# Patient Record
Sex: MALE | Race: WHITE | NOT HISPANIC OR LATINO | Employment: UNEMPLOYED | URBAN - NONMETROPOLITAN AREA
[De-identification: names, ages, dates, MRNs, and addresses within clinical notes are randomized per-mention and may not be internally consistent; named-entity substitution may affect disease eponyms.]

---

## 2020-10-22 ENCOUNTER — TRANSCRIBE ORDERS (OUTPATIENT)
Dept: ADMINISTRATIVE | Facility: HOSPITAL | Age: 40
End: 2020-10-22

## 2020-10-22 DIAGNOSIS — N50.819 PAIN IN TESTICLE, UNSPECIFIED LATERALITY: Primary | ICD-10-CM

## 2020-10-22 DIAGNOSIS — M54.50 LOW BACK PAIN, UNSPECIFIED BACK PAIN LATERALITY, UNSPECIFIED CHRONICITY, UNSPECIFIED WHETHER SCIATICA PRESENT: ICD-10-CM

## 2020-10-29 ENCOUNTER — APPOINTMENT (OUTPATIENT)
Dept: ULTRASOUND IMAGING | Facility: HOSPITAL | Age: 40
End: 2020-10-29

## 2022-03-14 ENCOUNTER — APPOINTMENT (OUTPATIENT)
Dept: OTHER | Facility: HOSPITAL | Age: 42
End: 2022-03-14

## 2022-03-14 ENCOUNTER — HOSPITAL ENCOUNTER (INPATIENT)
Facility: HOSPITAL | Age: 42
LOS: 2 days | Discharge: HOME | End: 2022-03-16
Attending: INTERNAL MEDICINE | Admitting: INTERNAL MEDICINE

## 2022-03-14 DIAGNOSIS — I63.9 CEREBROVASCULAR ACCIDENT (CVA), UNSPECIFIED MECHANISM: Primary | ICD-10-CM

## 2022-03-14 PROBLEM — E66.9 OBESITY: Status: ACTIVE | Noted: 2022-03-14

## 2022-03-14 PROBLEM — I10 HYPERTENSION: Chronic | Status: ACTIVE | Noted: 2022-03-14

## 2022-03-14 PROBLEM — E78.5 DYSLIPIDEMIA: Chronic | Status: ACTIVE | Noted: 2022-03-14

## 2022-03-14 PROBLEM — E66.9 OBESITY: Chronic | Status: ACTIVE | Noted: 2022-03-14

## 2022-03-14 PROBLEM — I10 HYPERTENSION: Status: ACTIVE | Noted: 2022-03-14

## 2022-03-14 PROBLEM — E78.5 DYSLIPIDEMIA: Status: ACTIVE | Noted: 2022-03-14

## 2022-03-14 PROBLEM — Z86.16 PERSONAL HISTORY OF COVID-19: Status: ACTIVE | Noted: 2022-03-14

## 2022-03-14 LAB — GLUCOSE BLDC GLUCOMTR-MCNC: 119 MG/DL (ref 70–130)

## 2022-03-14 PROCEDURE — 99223 1ST HOSP IP/OBS HIGH 75: CPT | Performed by: NURSE PRACTITIONER

## 2022-03-14 PROCEDURE — 99222 1ST HOSP IP/OBS MODERATE 55: CPT | Performed by: INTERNAL MEDICINE

## 2022-03-14 PROCEDURE — 82962 GLUCOSE BLOOD TEST: CPT

## 2022-03-14 RX ORDER — SODIUM CHLORIDE 0.9 % (FLUSH) 0.9 %
10 SYRINGE (ML) INJECTION EVERY 12 HOURS SCHEDULED
Status: DISCONTINUED | OUTPATIENT
Start: 2022-03-14 | End: 2022-03-16 | Stop reason: HOSPADM

## 2022-03-14 RX ORDER — ASPIRIN 325 MG
325 TABLET ORAL DAILY
Status: DISCONTINUED | OUTPATIENT
Start: 2022-03-15 | End: 2022-03-15

## 2022-03-14 RX ORDER — METOPROLOL SUCCINATE 25 MG/1
50 TABLET, EXTENDED RELEASE ORAL DAILY
COMMUNITY
End: 2022-03-16 | Stop reason: HOSPADM

## 2022-03-14 RX ORDER — PRASTERONE (DHEA) 25 MG
25 TABLET ORAL DAILY
COMMUNITY

## 2022-03-14 RX ORDER — HYDROCHLOROTHIAZIDE 50 MG/1
50 TABLET ORAL DAILY
COMMUNITY
End: 2022-03-16 | Stop reason: HOSPADM

## 2022-03-14 RX ORDER — SODIUM CHLORIDE 0.9 % (FLUSH) 0.9 %
10 SYRINGE (ML) INJECTION AS NEEDED
Status: DISCONTINUED | OUTPATIENT
Start: 2022-03-14 | End: 2022-03-16 | Stop reason: HOSPADM

## 2022-03-14 RX ORDER — ATORVASTATIN CALCIUM 20 MG/1
10 TABLET, FILM COATED ORAL NIGHTLY
COMMUNITY
End: 2022-03-16 | Stop reason: HOSPADM

## 2022-03-14 RX ORDER — ATORVASTATIN CALCIUM 40 MG/1
80 TABLET, FILM COATED ORAL NIGHTLY
Status: DISCONTINUED | OUTPATIENT
Start: 2022-03-14 | End: 2022-03-16 | Stop reason: HOSPADM

## 2022-03-14 RX ORDER — LOSARTAN POTASSIUM 25 MG/1
25 TABLET ORAL NIGHTLY
COMMUNITY

## 2022-03-14 RX ORDER — ASPIRIN 300 MG/1
300 SUPPOSITORY RECTAL DAILY
Status: DISCONTINUED | OUTPATIENT
Start: 2022-03-15 | End: 2022-03-15

## 2022-03-14 RX ADMIN — Medication 10 ML: at 23:54

## 2022-03-14 NOTE — H&P
Intensive Care Admission Note     Chief Complaint:  Acute ischemic stroke (HCC)    History of Present Illness     Liang Caputo is a 41 y.o. male who presents to [facility] from [facility] on 3/14/22 with complaints of left sided numbness and right-sided weakness concerning for stroke.     There is a PMH of HTN, dyslipidemia, prior + COVID, and obesity . He presented to the OSH ED with intermittent numbness on his occipital region with sustained sporadic numbness in his bilateral hands, feet, and left shin. Mild right-sided weakness noted on OSH clinical exam. LKW ~1500. NIH not mentioned in ACC report. His case was discussed with our stroke navigator team who recommended tPA and transfer for ongoing work-up. Initially the patient refused transfer but is now accepting tPA and corresponding transfer. He will be admitted to the ICU for higher level of care.     Problem List, Surgical History, Family, Social History, and ROS     Patient Active Problem List    Diagnosis    • *R/O AIS s/p tPA  [I63.9]    • Hypertension [I10]    • Dyslipidemia [E78.5]    • Personal history of COVID-19 [Z86.16]    • Obesity [E66.9]      No past surgical history on file.    Not on File  No current facility-administered medications on file prior to encounter.     No current outpatient medications on file prior to encounter.     MEDICATION LIST AND ALLERGIES REVIEWED.    No family history on file.     Social History     Social History Narrative   • Not on file     FAMILY AND SOCIAL HISTORY REVIEWED.      Review of systems unobtainable secondary to patient refusal to cooperate    Physical Exam and Clinical Information   Pulse 80   SpO2 97%   Patient is awake and alert, moving all extremities.  Otherwise unable to obtain any physical exam due to patient not willing to cooperate or allow hospital personnel in the room.            Invalid input(s): CHLORIDE  CrCl cannot be calculated (No successful lab value found.).          No  results found for: LACTATE       I reviewed the patient's results/ images and I agree with the reports.     Impression     R/O AIS s/p tPA     Hypertension    Dyslipidemia    Personal history of COVID-19    Obesity      Plan/Recommendations     On arrival to the ICU patient was abrasive and rude to the staff.  He refused to let the nurse into the room to do a physical exam on the patient and do the NIH stroke scale.  I came and evaluated the patient and he is well asked me to leave the room but not allow me to do any physical exam provide any other history that was taken from the chart.  We informed the patient that he had received TPA at there are certain protocols that we go through this is a stroke center we try to obtain to the high standard of care.  He informed us that at the other hospital he was logical smoke cigarettes and he is upset that he cannot do this.  In addition to that he is upset about the fact that his wife cannot visit.  The current hospital visitation policy is that visitors are allowed between 7 AM and 5 PM.  His wife would be allowed to come in the morning at 7 AM and visit.  We informed her that if he wants to refuse all care if he is more than welcome to this would be AGAINST MEDICAL ADVICE and that we would recommend he allow us to treat him appropriately with blood pressure control, and standard treatments for CVA as well as ongoing imaging per neurology.  Ultimately he has refused all levels of care and continues to want to move around the room despite us informing that if he were to have a intracranial bleed this could be devastating to him and he is also a high risk for bleeding secondary to the TPA if he were to fall have any head trauma.  Despite all these recommendations he still continues to refuse care.  He informed me that he would not follow any medical recommendations or care and to his wife is allowed to be at bedside.  It was made available to him to leave AMA at any time,  "he refused to do this unless his wife was coming to pick him up.  His wife was informed of his behavior and ultimately she said that she would not come and pick him up and that he is to stay there and she, \"is going to call her .\"  The patient though is very aware of his situation and is in no since altered, therefore he has every right to refuse medical care we cannot treat him, despite what the wife is requesting unless the patient agrees.     -We will follow stroke order set per neurology  -Recommend aspirin/statin and further imaging per neurology recommendations, if the patient were to at some point agree to treatment.    A total of 45 minutes were spent face-to-face with the patient during this encounter and over half of that time was spent on counseling and coordination of care.  I had a prolonged detailed conversation with the patient's as noted above.    PEYTON Hilario,   Pulmonary and Critical Care Medicine     CC: Sis Duval, LUIS ARMANDO  "

## 2022-03-14 NOTE — NURSING NOTE
ACC REVIEW REPORT: [facility]        PATIENT NAME: Liang Caputo    PATIENT ID: [id number]        COVID-19 ACC SCREENING       DOES THE PATIENT HAVE A FEVER GREATER THAN OR EQUAL .4: no    IS THE PATIENT EXPERIENCING SHORTNESS OF BREATH: no    DOES THE PATIENT HAVE A COUGH: no  DOES THE PATIENT HAVE ANY OF THE FOLLOWING RISK FACTORS:    EXPOSURE TO SUSPECTED OR KNOWN COVID-19: no    RECENT TRAVEL HISTORY TO ENDEMIC AREA (DOMESTIC/LOCAL): no    IS THE PATIENT A HEALTHCARE WORKER: no    HAS THE PATIENT EXPERIENCED A LOSS OF SENSE OF TASTE OR SMELL: unknown    HAS THE PATIENT BEEN TESTED FOR COVID-19: unknown    DATE TESTED: unknown    Pt had covid > one year ago; has not had covid vaccines        BED: N 231    BED TYPE: ICU    BED GIVEN TO: Fatemeh TOMAS BED GIVEN: 1700    TODAY'S DATE: 3/14/2022    TRANSFER DATE: 3/14/22    ETA: pending transport    TRANSFERRING FACILITY: [facility]    TRANSFERRING FACILITY PHONE # : [phone]    TRANSFERRING MD: Dragan    DATE/TIME REQUEST RECEIVED: 3-14-22@[address] [place] [address] RN: Afshan Patino    REPORT FROM: Fatemeh mueller 3-way conversation with Brittney & Dr Archibald  (hurried report)    TIME REPORT TAKEN: 1650    DIAGNOSIS: CVA    REASON FOR TRANSFER TO [facility]: higher level of care    TRANSPORTATION: pending    CLINICAL REASON FOR TRANSFER TO [facility]: 41 y.o. presented to the OSH at 1609; he developed numbness on the back of his head on the left side that lasted three minutes; also has rt arm & leg weakness that continues      CLINICAL INFORMATION    HEIGHT:     WEIGHT: 313#    ALLERGIES: PCN    INFECTIOUS DISEASE: unknown    ISOLATION:     VITAL SIGNS:   TIME: 1645  TEMP: 98.4  PULSE: 96  B/P: 123/68  RESP: 24        LAB INFORMATION: glucose 140    CULTURE INFORMATION:     MEDS/IV FLUIDS: IV in rt ac - TPA to be given      CARDIAC SYSTEM:    CHEST PAIN: no    RHYTHM: NSR    Is patient taking or has patient been given any drugs that could increase bleeding? TPA to be  given                      No home blood thinner taken    CARDIAC SYSTEM:  Pt has a hx of HTN, HLD  Dr Archibald had mentioned that patient's BP waselevated      RESPIRATORY SYSTEM:    OXYGEN: no    O2 SAT: 95% on RA    RESPIRATORY STATUS: no soa      CNS/MUSCULOSKELETAL    ALERT AND ORIENTED:  yes    KIMBERLEY COMA SCALE: 15    STROKE SCALE:  Unable to cite    CAT SCAN RESULTS: neg    CNS/MUSCULOSKELETAL NOTES: per RN - pt has rt arm & leg weakness that started at 1509 and numbness on the back of his head on the left side  Per Dr Archibald - pt had numbness of sp hands & feet and left shin today while watching TV that resolved; Dr Archibald said pt has weakness of rt hand and leg, strength 4/5      GI//GY    VOMITING: no    DIARRHEA: no    NAUSEA: no    PAST MEDICAL HISTORY: HTN, HLD, TIA, obesity    ADDITIONAL NOTES: CTA of H&N to be done      1733 - Samantha at at [facility] called to state pt is refusing to come here; cancel request for transfer    1759-Dr Archibald at [facility] called requesting to speak to the stroke navigator - per 3-way with Lydia, pt is now accepting TPA and they would like to transfer pt; - pt accepted    1822- nurse Fatemeh called to say patient's sx resolved prior to TPA - TPA was given at 1810; pt refusing to fly; will come by ambulance; states labs are WNL except BUN was 14; covid test to be done prior to departure         Ebony Patino RN  3/14/2022  17:00 EDT

## 2022-03-15 ENCOUNTER — APPOINTMENT (OUTPATIENT)
Dept: CARDIOLOGY | Facility: HOSPITAL | Age: 42
End: 2022-03-15

## 2022-03-15 ENCOUNTER — APPOINTMENT (OUTPATIENT)
Dept: CT IMAGING | Facility: HOSPITAL | Age: 42
End: 2022-03-15

## 2022-03-15 ENCOUNTER — APPOINTMENT (OUTPATIENT)
Dept: MRI IMAGING | Facility: HOSPITAL | Age: 42
End: 2022-03-15

## 2022-03-15 LAB
BH CV ECHO MEAS - AO MAX PG (FULL): 4.4 MMHG
BH CV ECHO MEAS - AO MAX PG: 9 MMHG
BH CV ECHO MEAS - AO MEAN PG (FULL): 2.6 MMHG
BH CV ECHO MEAS - AO MEAN PG: 5 MMHG
BH CV ECHO MEAS - AO ROOT AREA (BSA CORRECTED): 1.6
BH CV ECHO MEAS - AO ROOT AREA: 11.3 CM^2
BH CV ECHO MEAS - AO ROOT DIAM: 3.8 CM
BH CV ECHO MEAS - AO V2 MAX: 145.5 CM/SEC
BH CV ECHO MEAS - AO V2 MEAN: 106 CM/SEC
BH CV ECHO MEAS - AO V2 VTI: 29.5 CM
BH CV ECHO MEAS - ASC AORTA: 3.4 CM
BH CV ECHO MEAS - AVA(I,A): 2.9 CM^2
BH CV ECHO MEAS - AVA(I,D): 2.9 CM^2
BH CV ECHO MEAS - AVA(V,A): 3.1 CM^2
BH CV ECHO MEAS - AVA(V,D): 3.1 CM^2
BH CV ECHO MEAS - BSA(HAYCOCK): 2.7 M^2
BH CV ECHO MEAS - BSA: 2.4 M^2
BH CV ECHO MEAS - BZI_BMI: 50.8 KILOGRAMS/M^2
BH CV ECHO MEAS - BZI_METRIC_HEIGHT: 167.6 CM
BH CV ECHO MEAS - BZI_METRIC_WEIGHT: 142.9 KG
BH CV ECHO MEAS - EDV(CUBED): 120.8 ML
BH CV ECHO MEAS - EDV(MOD-SP2): 60.1 ML
BH CV ECHO MEAS - EDV(MOD-SP4): 127 ML
BH CV ECHO MEAS - EDV(TEICH): 115.2 ML
BH CV ECHO MEAS - EF(CUBED): 84.7 %
BH CV ECHO MEAS - EF(MOD-BP): 60.4 %
BH CV ECHO MEAS - EF(MOD-SP2): 63.9 %
BH CV ECHO MEAS - EF(MOD-SP4): 59 %
BH CV ECHO MEAS - EF(TEICH): 77.8 %
BH CV ECHO MEAS - ESV(CUBED): 18.4 ML
BH CV ECHO MEAS - ESV(MOD-SP2): 21.7 ML
BH CV ECHO MEAS - ESV(MOD-SP4): 52.1 ML
BH CV ECHO MEAS - ESV(TEICH): 25.6 ML
BH CV ECHO MEAS - FS: 46.6 %
BH CV ECHO MEAS - IVS/LVPW: 1.1
BH CV ECHO MEAS - IVSD: 1.3 CM
BH CV ECHO MEAS - LA DIMENSION: 2.4 CM
BH CV ECHO MEAS - LA/AO: 0.64
BH CV ECHO MEAS - LAD MAJOR: 6.8 CM
BH CV ECHO MEAS - LAT PEAK E' VEL: 8.6 CM/SEC
BH CV ECHO MEAS - LATERAL E/E' RATIO: 9.5
BH CV ECHO MEAS - LV DIASTOLIC VOL/BSA (35-75): 52.3 ML/M^2
BH CV ECHO MEAS - LV MASS(C)D: 259.5 GRAMS
BH CV ECHO MEAS - LV MASS(C)DI: 107 GRAMS/M^2
BH CV ECHO MEAS - LV MAX PG: 4.6 MMHG
BH CV ECHO MEAS - LV MEAN PG: 2.4 MMHG
BH CV ECHO MEAS - LV SYSTOLIC VOL/BSA (12-30): 21.5 ML/M^2
BH CV ECHO MEAS - LV V1 MAX: 107.6 CM/SEC
BH CV ECHO MEAS - LV V1 MEAN: 71.9 CM/SEC
BH CV ECHO MEAS - LV V1 VTI: 20.2 CM
BH CV ECHO MEAS - LVIDD: 4.9 CM
BH CV ECHO MEAS - LVIDS: 2.6 CM
BH CV ECHO MEAS - LVLD AP2: 7.9 CM
BH CV ECHO MEAS - LVLD AP4: 8.2 CM
BH CV ECHO MEAS - LVLS AP2: 6.3 CM
BH CV ECHO MEAS - LVLS AP4: 6.9 CM
BH CV ECHO MEAS - LVOT AREA (M): 4.2 CM^2
BH CV ECHO MEAS - LVOT AREA: 4.2 CM^2
BH CV ECHO MEAS - LVOT DIAM: 2.3 CM
BH CV ECHO MEAS - LVPWD: 1.3 CM
BH CV ECHO MEAS - MED PEAK E' VEL: 8.8 CM/SEC
BH CV ECHO MEAS - MEDIAL E/E' RATIO: 9.2
BH CV ECHO MEAS - MV A MAX VEL: 56.1 CM/SEC
BH CV ECHO MEAS - MV DEC SLOPE: 509.9 CM/SEC^2
BH CV ECHO MEAS - MV DEC TIME: 0.17 SEC
BH CV ECHO MEAS - MV E MAX VEL: 81.3 CM/SEC
BH CV ECHO MEAS - MV E/A: 1.4
BH CV ECHO MEAS - MV P1/2T MAX VEL: 105.9 CM/SEC
BH CV ECHO MEAS - MV P1/2T: 60.8 MSEC
BH CV ECHO MEAS - MVA P1/2T LCG: 2.1 CM^2
BH CV ECHO MEAS - MVA(P1/2T): 3.6 CM^2
BH CV ECHO MEAS - PA ACC TIME: 0.11 SEC
BH CV ECHO MEAS - PA MAX PG: 3 MMHG
BH CV ECHO MEAS - PA PR(ACCEL): 29.1 MMHG
BH CV ECHO MEAS - PA V2 MAX: 87.3 CM/SEC
BH CV ECHO MEAS - SI(AO): 137.4 ML/M^2
BH CV ECHO MEAS - SI(CUBED): 42.2 ML/M^2
BH CV ECHO MEAS - SI(LVOT): 35.1 ML/M^2
BH CV ECHO MEAS - SI(MOD-SP2): 15.8 ML/M^2
BH CV ECHO MEAS - SI(MOD-SP4): 30.9 ML/M^2
BH CV ECHO MEAS - SI(TEICH): 36.9 ML/M^2
BH CV ECHO MEAS - SV(AO): 333.5 ML
BH CV ECHO MEAS - SV(CUBED): 102.4 ML
BH CV ECHO MEAS - SV(LVOT): 85.1 ML
BH CV ECHO MEAS - SV(MOD-SP2): 38.4 ML
BH CV ECHO MEAS - SV(MOD-SP4): 74.9 ML
BH CV ECHO MEAS - SV(TEICH): 89.6 ML
BH CV ECHO MEAS - TAPSE (>1.6): 1.9 CM
BH CV ECHO MEASUREMENTS AVERAGE E/E' RATIO: 9.34
BH CV XLRA - RV BASE: 3.7 CM
BH CV XLRA - RV LENGTH: 7 CM
BH CV XLRA - RV MID: 2.7 CM
BH CV XLRA - TDI S': 14.8 CM/SEC
CHOLEST SERPL-MCNC: 140 MG/DL (ref 0–200)
GLUCOSE BLDC GLUCOMTR-MCNC: 114 MG/DL (ref 70–130)
HBA1C MFR BLD: 5.8 % (ref 4.8–5.6)
HDLC SERPL-MCNC: 30 MG/DL (ref 40–60)
LDLC SERPL CALC-MCNC: 51 MG/DL (ref 0–100)
LDLC/HDLC SERPL: 1.05 {RATIO}
LEFT ATRIUM VOLUME INDEX: 32.3 ML/M^2
LEFT ATRIUM VOLUME: 78.4 ML
LV EF 2D ECHO EST: 60 %
MAXIMAL PREDICTED HEART RATE: 181 BPM
STRESS TARGET HR: 154 BPM
TRIGL SERPL-MCNC: 393 MG/DL (ref 0–150)
VLDLC SERPL-MCNC: 59 MG/DL (ref 5–40)

## 2022-03-15 PROCEDURE — 99233 SBSQ HOSP IP/OBS HIGH 50: CPT | Performed by: STUDENT IN AN ORGANIZED HEALTH CARE EDUCATION/TRAINING PROGRAM

## 2022-03-15 PROCEDURE — 99232 SBSQ HOSP IP/OBS MODERATE 35: CPT | Performed by: INTERNAL MEDICINE

## 2022-03-15 PROCEDURE — 97530 THERAPEUTIC ACTIVITIES: CPT

## 2022-03-15 PROCEDURE — 97161 PT EVAL LOW COMPLEX 20 MIN: CPT

## 2022-03-15 PROCEDURE — 93306 TTE W/DOPPLER COMPLETE: CPT

## 2022-03-15 PROCEDURE — 80061 LIPID PANEL: CPT | Performed by: NURSE PRACTITIONER

## 2022-03-15 PROCEDURE — 93306 TTE W/DOPPLER COMPLETE: CPT | Performed by: INTERNAL MEDICINE

## 2022-03-15 PROCEDURE — 82962 GLUCOSE BLOOD TEST: CPT

## 2022-03-15 PROCEDURE — 70450 CT HEAD/BRAIN W/O DYE: CPT

## 2022-03-15 PROCEDURE — 70551 MRI BRAIN STEM W/O DYE: CPT

## 2022-03-15 PROCEDURE — 83036 HEMOGLOBIN GLYCOSYLATED A1C: CPT | Performed by: NURSE PRACTITIONER

## 2022-03-15 PROCEDURE — 97165 OT EVAL LOW COMPLEX 30 MIN: CPT

## 2022-03-15 RX ORDER — LORAZEPAM 2 MG/ML
INJECTION INTRAMUSCULAR
Status: DISCONTINUED
Start: 2022-03-15 | End: 2022-03-15 | Stop reason: WASHOUT

## 2022-03-15 RX ORDER — ASPIRIN 81 MG/1
81 TABLET, CHEWABLE ORAL DAILY
COMMUNITY
End: 2022-03-16 | Stop reason: HOSPADM

## 2022-03-15 RX ORDER — ASPIRIN 81 MG/1
81 TABLET, CHEWABLE ORAL DAILY
Status: DISCONTINUED | OUTPATIENT
Start: 2022-03-15 | End: 2022-03-16

## 2022-03-15 RX ADMIN — ASPIRIN 81 MG 81 MG: 81 TABLET ORAL at 20:45

## 2022-03-15 RX ADMIN — Medication 10 ML: at 20:45

## 2022-03-15 RX ADMIN — ATORVASTATIN CALCIUM 80 MG: 40 TABLET, FILM COATED ORAL at 20:45

## 2022-03-15 RX ADMIN — Medication 10 ML: at 09:49

## 2022-03-15 NOTE — CASE MANAGEMENT/SOCIAL WORK
Discharge Planning Assessment  [facility]     Patient Name: Liang Caputo  MRN: [id number]  Today's Date: 3/15/2022    Admit Date: 3/14/2022     Discharge Needs Assessment     Row Name 03/15/22 1247       Living Environment    People in Home spouse;sibling(s)    Name(s) of People in Home Ama-wife and older brother    Current Living Arrangements home  Lives in [place]    Primary Care Provided by self    Provides Primary Care For no one    Family Caregiver if Needed spouse    Family Caregiver Names Ama-wife    Quality of Family Relationships helpful;involved;supportive    Able to Return to Prior Arrangements yes       Resource/Environmental Concerns    Resource/Environmental Concerns none    Transportation Concerns no car       Transition Planning    Patient/Family Anticipates Transition to home with family    Patient/Family Anticipated Services at Transition none    Transportation Anticipated family or friend will provide       Discharge Needs Assessment    Equipment Currently Used at Home cane, straight    Concerns to be Addressed discharge planning    Anticipated Changes Related to Illness none    Equipment Needed After Discharge none    Current Discharge Risk psychiatric illness  Pt has severe anxiety and agoraphobia.               Discharge Plan     Row Name 03/15/22 2515       Plan    Plan Home with wife    Patient/Family in Agreement with Plan yes    Plan Comments Met with pt and wife at . He lives with her and his older brother in [facility]. Pt is independent but does use a cane. He has a history of anxiety and agoraphobia and stays mostly in the house. Pt has [employer] and no issues affording meds. He does feel he needs a cpap and discussed he would need to be arranged with a sleep study to qualify for a cpap. Pt has no further d/c needs at this time. PT/OT pending. CM will follow.              Continued Care and Services - Admitted Since 3/14/2022    Coordination has not been  started for this encounter.       Expected Discharge Date and Time     Expected Discharge Date Expected Discharge Time    Mar 17, 2022          Demographic Summary     Row Name 03/15/22 1246       General Information    Referral Source admission list    Preferred Language English    General Information Comments PCP Sis DURÁN. No POA.       Contact Information    Permission Granted to Share Info With ;family/designee  Pt's wife.               Functional Status     Row Name 03/15/22 1247       Functional Status    Usual Activity Tolerance good    Current Activity Tolerance good       Functional Status, IADL    Medications independent    Meal Preparation independent    Housekeeping independent    Laundry independent    Shopping independent       Employment/    Employment Status unemployed    Employment/ Comments Has [employer]. No issues affording meds.               Psychosocial    No documentation.                Abuse/Neglect    No documentation.                Legal    No documentation.                Substance Abuse    No documentation.                Patient Forms    No documentation.                   Carol Fulton RN

## 2022-03-15 NOTE — PLAN OF CARE
Goal Outcome Evaluation:  Plan of Care Reviewed With: patient        Progress: improving  Outcome Evaluation: OT eval complete. Pt is independent w/ t/fs, ADLs, and functional mobility. Pt has no further deficits which require cont skilled IPOT intervention, will sign off. Recommend pt DC home w/ assist.

## 2022-03-15 NOTE — PLAN OF CARE
Goal Outcome Evaluation:  Plan of Care Reviewed With: patient        Progress: improving  Outcome Evaluation: Patient was refusing all care after arriving to unit. Once apatient was agreeable to care an NIH of 1 was obtained for sensory deficit was later improved. This a.m. patient reports some dizziness when he turns his head from side to side. TPA checks were ordered Q1 hour instead of Q30 mins as patient was refusing more frequent neuro checks. Vitals stable. 2L NC worn for sleep.

## 2022-03-15 NOTE — PROGRESS NOTES
Stroke Progress Note       Chief Complaint: left sided paresthesias.     Subjective    Subjective     Subjective:  No acute events overnight     Review of Systems   Constitutional: No fatigue  HENT: Negative for nosebleeds and rhinorrhea.    Eyes: Negative for redness.   Respiratory: Negative for cough.    Gastrointestinal: Negative for anal bleeding.   Endocrine: Negative for polydipsia.   Genitourinary: Negative for enuresis and urgency.   Musculoskeletal: Negative for joint swelling.   Neurological: Negative for tremors.   Psychiatric/Behavioral: Negative for hallucinations.     Objective      Temp:  [97.8 °F (36.6 °C)-97.9 °F (36.6 °C)] 97.8 °F (36.6 °C)  Heart Rate:  [61-96] 75  Resp:  [16-18] 16  BP: (121-165)/(62-97) 150/97      NEURO  MENTAL STATUS: AAOx3, memory intact, fund of knowledge appropriate    LANG/SPEECH: Naming and repetition intact, fluent, follows 3-step commands    CRANIAL NERVES:    Pupils equal and reactive, EOMI intact, no gaze deviation, no nystagmus  No facial droop, cough and gag intact, shoulder shrug intact, tongue midline     MOTOR:  Moves all extremities equally    SENSORY: Normal to light touch all throughout     COORDINATION: Normal finger to nose and heel to shin, no tremor, no dysmetria    STATION: Not assessed due to patient condition    GAIT: Not assessed due to patient condition  Results Review:    I reviewed the patient's new clinical results.    Lab Results (last 24 hours)     Procedure Component Value Units Date/Time    POC Glucose Once [[id number]]  (Normal) Collected: 03/15/22 0559    Specimen: Blood Updated: 03/15/22 0600     Glucose 114 mg/dL      Comment: Meter: [id number] : [id number] Martel       POC Glucose Once [[id number]]  (Normal) Collected: 03/14/22 2316    Specimen: Blood Updated: 03/14/22 2317     Glucose 119 mg/dL      Comment: Meter: [id number] : [id number] Martel           No radiology results for the last day             Assessment/Plan     Assessment/Plan:      41 with vascular risk factors who claims to have at least 3 TIAs with similar symptoms. Patient received IV tPA for left side paresthesias at OSH. His CTAs did not reveal any significant atherosclerosis or LVO.     On my exam, patient neurological exam is normal. There is some functional overlay of symptoms and exam. I am not convinced that this is an ischemic vascular event. Pending MRI brain    Plan .   PT/OT/ Speech can work with the patient   Rest of the stroke work up pending  Follow the post tPA protocol  Normal blood pressure goals   Resume his home dose of aspirin after 24 hr CT  Stable to be transferred to the floor/       Dawson Little MD  03/15/22  11:47 EDT

## 2022-03-15 NOTE — THERAPY DISCHARGE NOTE
Patient Name: Liang Caputo  : 1980    MRN: [id number]                              Today's Date: 3/15/2022       Admit Date: 3/14/2022    Visit Dx: No diagnosis found.  Patient Active Problem List   Diagnosis   • R/O AIS s/p tPA    • Hypertension   • Dyslipidemia   • Personal history of COVID-19   • Obesity     Past Medical History:   Diagnosis Date   • Dyslipidemia 3/14/2022   • Hypertension 3/14/2022   • Obesity 3/14/2022   • Personal history of COVID-19 3/14/2022     History reviewed. No pertinent surgical history.   General Information     Row Name 03/15/22 1603          Physical Therapy Time and Intention    Document Type discharge evaluation/summary  -AE     Mode of Treatment physical therapy  -AE     Row Name 03/15/22 1603          General Information    Patient Profile Reviewed yes  -AE     Prior Level of Function independent:;all household mobility;gait;transfer;ADL's;bed mobility;dressing;bathing  Pt uses cane for mobility at home  -AE     Existing Precautions/Restrictions no known precautions/restrictions  -AE     Barriers to Rehab none identified  -AE     Row Name 03/15/22 1603          Living Environment    People in Home spouse;sibling(s)  -AE     Row Name 03/15/22 1603          Home Main Entrance    Number of Stairs, Main Entrance four  -AE     Stair Railings, Main Entrance none  -AE     Row Name 03/15/22 1603          Stairs Within Home, Primary    Number of Stairs, Within Home, Primary none  -AE     Stair Railings, Within Home, Primary none  -AE     Row Name 03/15/22 1603          Cognition    Orientation Status (Cognition) oriented x 4  -AE     Row Name 03/15/22 1603          Safety Issues, Functional Mobility    Safety Issues Affecting Function (Mobility) impulsivity;safety precaution awareness  -AE     Impairments Affecting Function (Mobility) endurance/activity tolerance;shortness of breath  -AE           User Key  (r) = Recorded By, (t) = Taken By, (c) = Cosigned By    Initials  Name Provider Type    AE Edilberto Kebede, PT Physical Therapist               Mobility     Row Name 03/15/22 1605          Bed Mobility    Comment, (Bed Mobility) UIC  -AE     Row Name 03/15/22 1605          Transfers    Comment, (Transfers) VCs to decrease impulsiveness with STS.  -AE     Row Name 03/15/22 1605          Sit-Stand Transfer    Sit-Stand [place] (Transfers) independent  -AE     Row Name 03/15/22 1605          Gait/Stairs (Locomotion)    [place] Level (Gait) standby assist  -AE     Assistive Device (Gait) other (see comments)  RUE support on tele monitor  -AE     Distance in Feet (Gait) 425  -AE     Deviations/Abnormal Patterns (Gait) base of support, wide;fadi decreased;stride length decreased  -AE     Comment, (Gait/Stairs) Pt demonstrated step through gait pattern with wide HOPE, decreased fadi, and decreased stride length. Pt demo LLE gait deviations but patient states this is baseline for the past few years and uses a cane to assist with gait.  -AE           User Key  (r) = Recorded By, (t) = Taken By, (c) = Cosigned By    Initials Name Provider Type    AE Edilberto Kebede, PT Physical Therapist               Obj/Interventions     Row Name 03/15/22 1607          Range of Motion Comprehensive    General Range of Motion bilateral lower extremity ROM WFL  -AE     Row Name 03/15/22 1607          Strength Comprehensive (MMT)    Comment, General Manual Muscle Testing (MMT) Assessment BLE grossly WFL  -AE     Row Name 03/15/22 1607          Balance    Balance Assessment sitting static balance;sitting dynamic balance;sit to stand dynamic balance;standing static balance;standing dynamic balance  -AE     Static Sitting Balance independent  -AE     Dynamic Sitting Balance independent  -AE     Position, Sitting Balance sitting in chair  -AE     Sit to Stand Dynamic Balance independent  -AE     Static Standing Balance independent  -AE     Dynamic Standing Balance supervision  -AE      Position/Device Used, Standing Balance supported  -AE     Balance Interventions standing;sit to stand;static;dynamic;moderate challenge;narrowed base of support;single limb stance;vision occluded activity;weight shifting activity  -AE     Comment, Balance No LOB noted  -AE     Row Name 03/15/22 1607          Sensory Assessment (Somatosensory)    Sensory Assessment (Somatosensory) left LE  -AE     Left LE Sensory Assessment light touch awareness;impaired;other (see comments)  Above knee  -AE           User Key  (r) = Recorded By, (t) = Taken By, (c) = Cosigned By    Initials Name Provider Type    AE Edilberto Kebede, PT Physical Therapist               Goals/Plan    No documentation.                Clinical Impression     Row Name 03/15/22 1609          Pain    Pretreatment Pain Rating 2/10  -AE     Posttreatment Pain Rating 0/10 - no pain  -AE     Pain Location lower  -AE     Pain Location - back  -AE     Pre/Posttreatment Pain Comment tolerated  -AE     Pain Intervention(s) Repositioned;Ambulation/increased activity  -AE     Row Name 03/15/22 1608          Plan of Care Review    Plan of Care Reviewed With patient;spouse  -AE     Progress no change  -AE     Outcome Evaluation PT evaluation completed. Pt demo independence with transfers. Pt ambulated 425ft with SBA and RUE support on tele monitor. Pt also completed balance screen which included SLS, narrowed HOPE, eyes open and eyes closed, and standing balance with perturbations. Pt has no further deficits which require continued skilled IP PT interventions at this time, will sign off. Please reconsult if patient medical status changes. Recommend DC home with assist.  -AE     Row Name 03/15/22 1608          Therapy Assessment/Plan (PT)    Criteria for Skilled Interventions Met (PT) no problems identified which require skilled intervention  -AE     Row Name 03/15/22 1606          Vital Signs    Pre Systolic BP Rehab 172  -AE     Pre Treatment Diastolic   -AE      Post Systolic BP Rehab 164  -AE     Post Treatment Diastolic   -AE     Pretreatment Heart Rate (beats/min) 94  -AE     Posttreatment Heart Rate (beats/min) 100  -AE     Pre SpO2 (%) 96  -AE     O2 Delivery Pre Treatment room air  -AE     Post SpO2 (%) 96  -AE     O2 Delivery Post Treatment room air  -AE     Pre Patient Position Sitting  -AE     Intra Patient Position Standing  -AE     Post Patient Position Sitting  -AE     Row Name 03/15/22 1609          Positioning and Restraints    Pre-Treatment Position sitting in chair/recliner  -AE     Post Treatment Position chair  -AE     In Chair notified nsg;sitting;call light within reach;encouraged to call for assist;with family/caregiver;waffle cushion  -AE           User Key  (r) = Recorded By, (t) = Taken By, (c) = Cosigned By    Initials Name Provider Type    AE Edilberto Kebede, PT Physical Therapist               Outcome Measures     Row Name 03/15/22 1613          How much help from another person do you currently need...    Turning from your back to your side while in flat bed without using bedrails? 4  -AE     Moving from lying on back to sitting on the side of a flat bed without bedrails? 4  -AE     Moving to and from a bed to a chair (including a wheelchair)? 4  -AE     Standing up from a chair using your arms (e.g., wheelchair, bedside chair)? 4  -AE     Climbing 3-5 steps with a railing? 4  -AE     To walk in hospital room? 4  -AE     AM-PAC 6 Clicks Score (PT) 24  -AE     Row Name 03/15/22 1613 03/15/22 1520       Modified [place] Scale    Pre-Stroke Modified [place]inder Scale 6 - Unable to determine (UTD) from the medical record documentation  -AE 6 - Unable to determine (UTD) from the medical record documentation  -CS    Modified [place] Scale 0 - No Symptoms at all.  -AE 0 - No Symptoms at all.  -CS    Row Name 03/15/22 1613 03/15/22 1520       Functional Assessment    Outcome Measure Options AM-PAC 6 Clicks Basic Mobility (PT);Modified [place]  -AE AM-PAC  6 Clicks Daily Activity (OT);Modified [place]  -CS          User Key  (r) = Recorded By, (t) = Taken By, (c) = Cosigned By    Initials Name Provider Type    CS Blanca Gillette, KUMAR Occupational Therapist    AE Edilberto Kebede, PT Physical Therapist              Physical Therapy Education                 Title: PT OT SLP Therapies (In Progress)     Topic: Physical Therapy (Done)     Point: Mobility training (Done)     Learning Progress Summary           Patient Acceptance, E, VU by AE at 3/15/2022 1525                   Point: Home exercise program (Done)     Learning Progress Summary           Patient Acceptance, E, VU by AE at 3/15/2022 1525                   Point: Body mechanics (Done)     Learning Progress Summary           Patient Acceptance, E, VU by AE at 3/15/2022 1525                   Point: Precautions (Done)     Learning Progress Summary           Patient Acceptance, E, VU by AE at 3/15/2022 1525                               User Key     Initials Effective Dates Name Provider Type Discipline    AE 09/21/21 -  Edilberto Kebede PT Physical Therapist PT              PT Recommendation and Plan     Plan of Care Reviewed With: patient, spouse  Progress: no change  Outcome Evaluation: PT evaluation completed. Pt demo independence with transfers. Pt ambulated 425ft with SBA and RUE support on tele monitor. Pt also completed balance screen which included SLS, narrowed HOPE, eyes open and eyes closed, and standing balance with perturbations. Pt has no further deficits which require continued skilled IP PT interventions at this time, will sign off. Please reconsult if patient medical status changes. Recommend DC home with assist.     Time Calculation:    PT Charges     Row Name 03/15/22 1615             Time Calculation    Start Time 1525  -AE              Untimed Charges    PT Eval/Re-eval Minutes 47  -AE              Total Minutes    Untimed Charges Total Minutes 47  -AE       Total Minutes 47  -AE            User  Key  (r) = Recorded By, (t) = Taken By, (c) = Cosigned By    Initials Name Provider Type    AE Edilberto Kebede, PT Physical Therapist              Therapy Charges for Today     Code Description Service Date Service Provider Modifiers Qty    69178470531 HC PT EVAL LOW COMPLEXITY 4 3/15/2022 Edilberto Kebede PT GP 1          PT G-Codes  Outcome Measure Options: AM-PAC 6 Clicks Basic Mobility (PT), Modified [place]  AM-PAC 6 Clicks Score (PT): 24  AM-PAC 6 Clicks Score (OT): 24  Modified Kulwinder Scale: 0 - No Symptoms at all.    PT Discharge Summary  Anticipated Discharge Disposition (PT): home with assist  Reason for Discharge: At baseline function  Discharge Destination: Home with assist    Edilberto Kebede PT  3/15/2022

## 2022-03-15 NOTE — PROGRESS NOTES
Pulmonary/Critical Care Follow-up     LOS: 1 day   Patient Care Team:  Sis Duval APRN as PCP - General (Family Medicine)    Chief Complaint: Strokelike symptoms      Subjective      Initial history (from H&P 3/14/2022):    Liang Caputo is a 41 y.o. male who presents to [facility] from [facility] on 3/14/22 with complaints of left sided numbness and right-sided weakness concerning for stroke.      There is a PMH of HTN, dyslipidemia, prior + COVID, and obesity . He presented to the OSH ED with intermittent numbness on his occipital region with sustained sporadic numbness in his bilateral hands, feet, and left shin. Mild right-sided weakness noted on OSH clinical exam. LKW ~1500. NIH not mentioned in ACC report. His case was discussed with our stroke navigator team who recommended tPA and transfer for ongoing work-up. Initially the patient refused transfer but is now accepting tPA and corresponding transfer. He will be admitted to the ICU for higher level of care.     (End of copied text).    Interval History:     Patient had a stroke evaluation today after receiving TPA overnight.  MRI showed a small, punctate focus of restricted diffusion in the right cerebellar hemisphere.  Patient reports some left facial numbness as well as some dizziness.  Denies weakness.    History taken from: Patient    PMH/FH/Social History were reviewed and updated appropriately in the electronic medical record.     Review of Systems:    Review of 14 systems was completed with positives and pertinent negatives noted in the subjective section.  All other systems reviewed and are negative.         Objective     Vital Signs  Temp:  [97.8 °F (36.6 °C)-98 °F (36.7 °C)] 98 °F (36.7 °C)  Heart Rate:  [61-96] 86  Resp:  [16-18] 16  BP: (121-165)/() 147/102  03/14 0701 - 03/15 0700  In: 500 [P.O.:500]  Out: 275 [Urine:275]  Body mass index is 50.84 kg/m².     IV drips:  niCARdipine       Physical Exam:     Constitutional:    Alert, cooperative, in no acute distress   Head:   Normocephalic, without obvious abnormality, atraumatic   Eyes:           Lids and lashes normal, conjunctivae and sclerae normal.  No icterus, no pallor, corneas clear, PER   ENMT:  Ears appear intact with no abnormalities noted        Neck:  No adenopathy, supple, trachea midline, no thyromegaly, no JVD   Lungs/Resp:    Normal effort, symmetric chest rise, no crepitus, clear to      auscultation bilaterally, no chest wall tenderness                Heart/CV:   Regular rhythm and normal rate, normal S1 and S2, no            murmur   Abdomen/GI:    Normal bowel sounds, no masses, no organomegaly, soft        nontender, nondistended   :    Deferred   Extremities/MSK:  No clubbing or cyanosis.  No edema.  Normal tone.    No deformities.    Pulses:  Pulses palpable and equal bilaterally   Skin:  No bleeding, bruising or rash   Heme/Lymph:  No cervical or supraclavicular adenopathy.   Neurologic:    Psychiatric:    Moves all extremities with no obvious focal motor deficit.  Cranial nerves 2 - 12 grossly intact  Oriented x 3, normal affect.    The above physical exam findings were reviewed and reflect my exam findings as of today's exam.   Electronically signed by:  Steven Hampton MD  03/15/22  17:19 EDT      Results Review:     I reviewed the patient's new clinical results.         Invalid input(s): LABALBU, PROT        Invalid input(s): NEUTOPHILPCT,  EOSPCT            Results from last 7 days   Lab Units 03/15/22  1611   HEMOGLOBIN A1C % 5.80*       I reviewed the patient's new imaging including images and reports.    Echocardiogram 3/15/2020:    · Estimated left ventricular EF = 60% Left ventricular systolic function is normal.  · Left ventricular diastolic function was normal.  · Left ventricular wall thickness is consistent with mild concentric hypertrophy.  · Saline test results are negative.    MRI brain 3/15/2022:  IMPRESSION:  Punctate focus of partially  restricted diffusion in the right cerebellar  hemisphere, without evidence of associated hemorrhage or significant  mass effect, favoring subacute lacunar infarct. There is otherwise no  evidence of additional acute ischemia, hemorrhage, mass or mass effect.    Medication Review:   aspirin, 325 mg, Oral, Daily   Or  aspirin, 300 mg, Rectal, Daily  atorvastatin, 80 mg, Oral, Nightly  sodium chloride, 10 mL, Intravenous, Q12H      niCARdipine, 5-15 mg/hr        Assessment/Plan         R/O AIS s/p tPA     Hypertension    Dyslipidemia    Personal history of COVID-19    Obesity    41 y.o. smoker admitted for possible stroke.  History of hypertension, hyperlipidemia, prior Covid, obesity.  Patient received TPA prior to arrival.    Patient was refusing evaluation overnight.  Now he is more agreeable.    MRI shows a small right cerebellar stroke.    Echo within normal limits.    Plan:  1.  Stroke status post TPA: No LVO on CTA.  MRI with small right cerebellar stroke.  Repeat CT scan this evening at 7 PM.  Start aspirin if no evidence of bleed.  Continue statin.  Continue PT/OT.  2.  Hypertension: Cardene as needed.  Resume home medications when appropriate..  3.  Neurology following.  Probably home tomorrow with secondary prevention.    Electronically signed by:  Steven Hampton MD  03/15/22  17:19 EDT      *. Please note that portions of this note were completed with [employer] - a voice recognition program.

## 2022-03-15 NOTE — CONSULTS
Stroke Consult Note    Patient Name: Liang Caputo   MRN: [id number]  Age: 41 y.o.  Sex: male  : 1980    Primary Care Physician: Sis Duval APRN  Referring Physician:  Luis Shah MD    Patient seen at 2220 on arrival from OSH.     Handedness: Right  Race:      Chief Complaint/Reason for Consultation: Sensory Deficit     Subjective .  HPI:   Mr. Liang Caputo is a 41-year-old right-handed  male with a past medical history significant for current tobacco abuse, hypertension, dyslipidemia, prior Covid in  (unvaccinated), severe anxiety, and obesity who presented to  [facility] this afternoon with complaints of intermittent bilateral sensory deficit of the face, arms, legs as well as subtle right-sided weakness.  Initially the patient was offered TPA and he was refusing this treatment at the outside hospital, however he ultimately decided to receive TPA as well as except transfer to our hospital. He was administered IV TPA at the outside hospital at 1810 and was subsequently transferred to [facility] for higher level of care.  He will be admitted to the neuro ICU under the intensivist service for further work-up.    Last Known Normal Date/Time: 1500 EST     Review of Systems   Constitutional: Negative.    HENT: Negative for trouble swallowing.    Respiratory: Negative for cough and shortness of breath.    Cardiovascular: Negative for chest pain and palpitations.   Gastrointestinal: Negative for diarrhea, nausea and vomiting.   Genitourinary: Negative for difficulty urinating and dysuria.   Musculoskeletal: Negative.    Skin: Negative.    Neurological: Positive for dizziness, numbness and headaches. Negative for facial asymmetry, speech difficulty and weakness.   Psychiatric/Behavioral: Positive for agitation and behavioral problems. The patient is nervous/anxious.       Past Medical History:   Diagnosis Date   • Dyslipidemia 3/14/2022   •  Hypertension 3/14/2022   • Obesity 3/14/2022   • Personal history of COVID-19 3/14/2022     No past surgical history on file.  No family history on file.  Social History     Socioeconomic History   • Marital status: Unknown     Allergies   Allergen Reactions   • Penicillins Rash     Prior to Admission medications    Medication Sig Start Date End Date Taking? Authorizing Provider   atorvastatin (LIPITOR) 20 MG tablet Take 20 mg by mouth Every Night.    Amber Paul MD   hydroCHLOROthiazide (HYDRODIURIL) 50 MG tablet Take 50 mg by mouth Daily. mornings    Amber Paul MD   losartan (COZAAR) 25 MG tablet Take 25 mg by mouth Every Night.    Amber Paul MD   metoprolol succinate XL (TOPROL-XL) 25 MG 24 hr tablet Take 25 mg by mouth Daily. evenings    Amber Paul MD   Prasterone, DHEA, (YL DHEA) 25 MG tablet Take 25 mg by mouth Daily. evenings    Amber Paul MD         Objective     Temp:  [97.9 °F (36.6 °C)] 97.9 °F (36.6 °C)  Heart Rate:  [77-96] 80  Resp:  [18] 18  BP: (121-136)/(62-87) 136/83  Neurological Exam  Mental Status  Awake, alert and oriented to person, place and time.Alert. Oriented to person, place, time and situation. Oriented to person, place, and time. Speech is normal. Language is fluent with no aphasia.    Cranial Nerves  CN II: Visual acuity is normal. Visual fields full to confrontation.  CN III, IV, VI: Extraocular movements intact bilaterally. Normal lids and orbits bilaterally. Pupils equal round and reactive to light bilaterally.  CN V:  Right: Abnormal facial sensation:  Left: Abnormal facial sensation: Patient reports numbness and tingling in the right temporal region and he reports tingling in the left temple down to the cheek.  CN VII: Full and symmetric facial movement.  CN VIII: Hearing is intact bilaterally.  CN IX, X: Palate elevates symmetrically. Normal gag reflex.  CN XI: Shoulder shrug strength is normal.  CN XII: Tongue midline without  atrophy or fasciculations.    Motor  Normal muscle bulk throughout. No fasciculations present. Normal muscle tone. Strength is 5/5 throughout all four extremities.    Sensory  Light touch abnormality: Sensation: Patient reports intermittent sensory deficit in bilateral hands, specifically in the knuckles, right and left side of the face sensory deficit, as well as bilateral shins..     Coordination    Finger-to-nose, rapid alternating movements and heel-to-shin normal bilaterally without dysmetria.    Gait   Normal gait.  Normal casual gait noted.    Physical Exam  Constitutional:       General: He is not in acute distress.     Appearance: He is obese. He is not ill-appearing or toxic-appearing.   HENT:      Head: Normocephalic and atraumatic.      Nose: Nose normal.   Eyes:      General: Lids are normal.      Extraocular Movements: Extraocular movements intact.      Pupils: Pupils are equal, round, and reactive to light.   Cardiovascular:      Rate and Rhythm: Normal rate and regular rhythm.   Pulmonary:      Effort: Pulmonary effort is normal.   Musculoskeletal:         General: Normal range of motion.      Cervical back: Normal range of motion.   Skin:     General: Skin is warm and dry.   Neurological:      Mental Status: He is alert and oriented to person, place, and time.      Cranial Nerves: No cranial nerve deficit.      Sensory: Sensory deficit present.      Motor: No weakness.      Coordination: Coordination is intact.      Gait: Gait normal.      Deep Tendon Reflexes: Strength normal.   Psychiatric:         Speech: Speech normal.      Comments: Patient is very apparently anxious with belligerent behavior     Acute Stroke Data    IV Thrombolytic (TPA/Tenecteplase) Inclusion / Exclusion Criteria    Time: 23:59 EDT  Person Administering Scale: LUIS ARMANDO Saenz    Inclusion Criteria  [x]   18 years of age or greater   [x]   Onset of symptoms < 4.5 hours before beginning treatment (stroke onset = time  patient was last seen well or without symptoms).   []   Diagnosis of acute ischemic stroke causing measurable disabling deficit (Complete Hemianopia, Any Aphasia, Visual or Sensory Extinction, Any weakness limiting sustained effort against gravity)   []   Any remaining deficit considered potentially disabling in view of patient and practitioner   Exclusion criteria (Do not proceed with Alteplase if any are checked under exclusion criteria)  []   Onset unknown or GREATER than 4.5 hours   []   ICH on CT/MRI   []   CT demonstrates hypodensity representing acute or subacute infarct   []   Significant head trauma or prior stroke in the previous 3 months   []   Symptoms suggestive of subarachnoid hemorrhage   []   History of un-ruptured intracranial aneurysm GREATER than 10 mm   []   Recent intracranial or intraspinal surgery within the last 3 months   []   Arterial puncture at a non-compressible site in the previous 7 days   []   Active internal bleeding   []   Acute bleeding tendency   []   Platelet count LESS than 100,000 for known hematological diseases such as leukemia, thrombocytopenia or chronic cirrhosis   []   Current use of anticoagulant with INR GREATER than 1.7 or PT GREATER than 15 seconds, aPTT GREATER than 40 seconds   []   Heparin received within 48 hours, resulting in abnormally elevated aPTT GREATER than upper limit of normal   []   Current use of direct thrombin inhibitors or direct factor Xa inhibitors in the past 48 hours   []   Elevated blood pressure refractory to treatment (systolic GREATER than 185 mm/Hg or diastolic  GREATER than 110 mm/Hg   []   Suspected infective endocarditis and aortic arch dissection   []   Current use of therapeutic treatment dose of low-molecular-weight heparin (LMWH) within the previous 24 hours   []   Structural GI malignancy or bleed   Relative exclusion for all patients  []   Only minor nondisabling symptoms   []   Pregnancy   []   Seizure at onset with postictal  residual neurological impairments   []   Major surgery or previous trauma within past 14 days   []   History of previous spontaneous ICH, intracranial neoplasm, or AV malformation   []   Postpartum (within previous 14 days)   []   Recent GI or urinary tract hemorrhage (within previous 21 days)   []   Recent acute MI (within previous 3 months)   []   History of unruptured intracranial aneurysm LESS than 10 mm   []   History of ruptured intracranial aneurysm   []   Blood glucose LESS than 50 mg/dL (2.7 mmol/L)   []   Dural puncture within the last 7 days   []   Known GREATER than 10 cerebral microbleeds   Additional exclusions for patients with symptoms onset between 3 and 4.5 hours.  []   Age > 80.   []   On any anticoagulants regardless of INR  >>> Warfarin (Coumadin), Heparin, Enoxaparin (Lovenox), fondaparinux (Arixtra), bivalirudin (Angiomax), Argatroban, dabigatran (Pradaxa), rivaroxaban (Xarelto), or apixaban (Eliquis)   []   Severe stroke (NIHSS > 25).   []   History of BOTH diabetes and previous ischemic stroke.   []   The risks and benefits have been discussed with the patient or family related to the administration of IV alteplase for stroke symptoms.   []   I have discussed and reviewed the patient's case and imaging with the attending prior to IV Thrombolytic (TPA/Tenecteplase).   OSH Time Thrombolytic administered     TPA administered at outside hospital ([facility]) prior to patient arrival.    Hospital Meds:  Scheduled- [START ON 3/15/2022] aspirin, 325 mg, Oral, Daily   Or  [START ON 3/15/2022] aspirin, 300 mg, Rectal, Daily  atorvastatin, 80 mg, Oral, Nightly  sodium chloride, 10 mL, Intravenous, Q12H      Infusions- niCARdipine, 5-15 mg/hr       PRNs- sodium chloride    Functional Status Prior to Current Stroke/[place] Score: 0    NIH Stroke Scale  Time: 2220 EDT  Person Administering Scale: LUIS ARMANDO Saenz  Interval:  (Patient is agreeable to treatment)  1a. Level of Consciousness:  0-->Alert, keenly responsive  1b. LOC Questions: 0-->Answers both questions correctly  1c. LOC Commands: 0-->Performs both tasks correctly  2. Best Gaze: 0-->Normal  3. Visual: 0-->No visual loss  4. Facial Palsy: 0-->Normal symmetrical movements  5a. Motor Arm, Left: 0-->No drift, limb holds 90 (or 45) degrees for full 10 secs  5b. Motor Arm, Right: 0-->No drift, limb holds 90 (or 45) degrees for full 10 secs  6a. Motor Leg, Left: 0-->No drift, leg holds 30 degree position for full 5 secs  6b. Motor Leg, Right: 0-->No drift, leg holds 30 degree position for full 5 secs  7. Limb Ataxia: 0-->Absent  8. Sensory: 1-->Mild-to-moderate sensory loss, patient feels pinprick is less sharp or is dull on the affected side, or there is a loss of superficial pain with pinprick, but patient is aware of being touched  9. Best Language: 0-->No aphasia, normal  10. Dysarthria: 0-->Normal  11. Extinction and Inattention (formerly Neglect): 0-->No abnormality    Total (NIH Stroke Scale): 1      Results Reviewed:  I have personally reviewed current lab, radiology, and data and agree with results.    OSH imaging and labs.      Assessment/Plan:  Mr. Liang Caputo is a 41-year-old right-handed  male with a past medical history significant for current tobacco abuse, hypertension, dyslipidemia, prior Covid in 2020 (unvaccinated), severe anxiety, and obesity who presented to  [facility] this afternoon with complaints of intermittent bilateral sensory deficit of the face, arms, legs as well as subtle right-sided weakness.      1. Sensory Deficit, S/P TPA at OSH  -Ischemic stroke admission with thrombolytic therapy order set initiated  -Aspirin 325 mg to be given after 24-hour CT of the head if no hemorrhage noted  -Lipitor 80 mg nightly  -Bedside dysphagia screening prior to any oral intake including p.o. medications  -If patient passes bedside dysphagia screening he is appropriate for a cardiac diet  -Bleeding end  postthrombolytic precautions in place  -TTE with bubble study in a.m.  -24-hour CT of the head tomorrow  -Patient reports extreme claustrophobia with MRI, he is agreeable to MRI in the morning once his wife is here, okay for IV Ativan prior to MRI  *CTA head and neck from outside hospital to be uploaded from disc, once uploaded if unable to review imaging will add MRA head and neck to MRI imaging in a.m.  -Hemoglobin A1c and lipid panel in a.m.  -Encouraged strict bedrest with patient, however he reports he cannot go to the bathroom in the bed.  He is okay for use of a bedside commode/urinal with staff present at the bedside, discussed this at length with the patient and he is agreeable  -Neurochecks and NIHSS  -PT/OT/SLP to see and assess  -Diabetes educator to see and assess if appropriate  -CM to see and assess     2.  Hypertension  -Management per ICU team  -Allow for permissive hypertension overnight with a SBP up to 180  -Cardene can be utilized as needed    3.  Hyperlipidemia  -FLP in a.m.  -80 mg Lipitor nightly    4.  Tobacco abuse  -Nicotine patch offered and refused  -Okay for 21mg nicotine patch if patient changes his mind  -Discussed with patient he is not allowed to go outside and smoke      Very lengthy discussion with the patient at the bedside.  Mr. Caputo was very belligerent, cursing and threatening the staff.  He was refusing any treatment, examination or assessment, and was discussing leaving AMA on arrival.  ICU medicine team discussed leaving AMA with patient and he was offered the ability to do so at any time.  Mr. Caputo was very upset regarding our current visitor policy as well as not being able to go outside and smoke or be up ad erin throughout the ICU. I personally was able to discuss safety concerns including high risk for intracranial bleed secondary to TPA administration with patient as well as our current hospital policies and regulations, including COVID-19 visitor policies.  House  supervisor was present at the bedside during this conversation.  Patient is amendable to overnight stay and as well as treatment at this time.  Lengthy discussion regarding plan of care with patient, charge RN, patient RN, and house supervisor.     Stroke neurology will continue to follow.  Please call with any further questions or concerns.    Amaya Alva, APRN  March 14, 2022  2216 EDT

## 2022-03-15 NOTE — PLAN OF CARE
Goal Outcome Evaluation:  Plan of Care Reviewed With: patient, spouse        Progress: no change  Outcome Evaluation: PT evaluation completed. Pt demo independence with transfers. Pt ambulated 425ft with SBA and RUE support on tele monitor. Pt also completed balance screen which included SLS, narrowed HOPE, eyes open and eyes closed, and standing balance with perturbations. Pt has no further deficits which require continued skilled IP PT interventions at this time, will sign off. Please reconsult if patient medical status changes. Recommend DC home with assist.

## 2022-03-15 NOTE — THERAPY DISCHARGE NOTE
Acute Care - Occupational Therapy Discharge  [facility]    Patient Name: Liang Caputo  : 1980    MRN: [id number]                              Today's Date: 3/15/2022       Admit Date: 3/14/2022    Visit Dx: No diagnosis found.  Patient Active Problem List   Diagnosis   • R/O AIS s/p tPA    • Hypertension   • Dyslipidemia   • Personal history of COVID-19   • Obesity     Past Medical History:   Diagnosis Date   • Dyslipidemia 3/14/2022   • Hypertension 3/14/2022   • Obesity 3/14/2022   • Personal history of COVID-19 3/14/2022     No past surgical history on file.   General Information     Row Name 03/15/22 1511          OT Time and Intention    Document Type discharge evaluation/summary  -CS     Mode of Treatment occupational therapy  -CS     Row Name 03/15/22 151          General Information    Patient Profile Reviewed yes  -CS     Prior Level of Function independent:;all household mobility;ADL's  -CS     Existing Precautions/Restrictions no known precautions/restrictions  -CS     Barriers to Rehab none identified  -CS     Row Name 03/15/22 1511          Living Environment    People in Home spouse;sibling(s)  -CS     Row Name 03/15/22 1511          Cognition    Orientation Status (Cognition) oriented x 4  -CS           User Key  (r) = Recorded By, (t) = Taken By, (c) = Cosigned By    Initials Name Provider Type    CS Blanca Gillette OT Occupational Therapist               Mobility/ADL's     Row Name 03/15/22 151          Transfers    Transfers sit-stand transfer;stand-sit transfer  -CS     Sit-Stand [place] (Transfers) independent  -CS     Stand-Sit [place] (Transfers) independent  -CS     Row Name 03/15/22 151          Functional Mobility    Functional Mobility- Ind. Level independent  -CS     Functional Mobility-Distance (Feet) --  in room  -CS     Row Name 03/15/22 151          Activities of Daily Living    BADL Assessment/Intervention lower body dressing  -CS     Row Name 03/15/22  1511          Lower Body Dressing Assessment/Training    [place] Level (Lower Body Dressing) don;shoes/slippers;independent  -CS     Position (Lower Body Dressing) supported sitting  -CS           User Key  (r) = Recorded By, (t) = Taken By, (c) = Cosigned By    Initials Name Provider Type    CS Blanca Gillette OT Occupational Therapist               Obj/Interventions     Row Name 03/15/22 1512          Sensory Assessment (Somatosensory)    Sensory Assessment (Somatosensory) UE sensation intact  -[facility] Name 03/15/22 1512          Vision Assessment/Intervention    Visual Impairment/Limitations WFL  -CS     [facility] Name 03/15/22 1512          Range of Motion Comprehensive    General Range of Motion bilateral upper extremity ROM WFL  -[facility] Name 03/15/22 1512          Strength Comprehensive (MMT)    Comment, General Manual Muscle Testing (MMT) Assessment BUE grossly WFL  -[facility] Name 03/15/22 1512          Motor Skills    Motor Skills coordination  -     Coordination bilateral;finger to nose;WFL  -CS     Row Name 03/15/22 1512          Balance    Balance Assessment sitting static balance;sitting dynamic balance;standing static balance;standing dynamic balance  -CS     Static Sitting Balance independent  -CS     Dynamic Sitting Balance independent  -CS     Position, Sitting Balance sitting in chair  -CS     Static Standing Balance independent  -CS     Dynamic Standing Balance independent  -CS     Position/Device Used, Standing Balance unsupported  -CS           User Key  (r) = Recorded By, (t) = Taken By, (c) = Cosigned By    Initials Name Provider Type    CS Blanca Gillette OT Occupational Therapist               Goals/Plan    No documentation.                Clinical Impression     [facility] Name 03/15/22 1513          Pain Assessment    Pretreatment Pain Rating 0/10 - no pain  -CS     Posttreatment Pain Rating 0/10 - no pain  -CS     [facility] Name 03/15/22 1513          Plan of Care Review    Plan of Care  Reviewed With patient  -CS     Progress improving  -CS     Outcome Evaluation OT eval complete. Pt is independent w/ t/fs, ADLs, and functional mobility. Pt has no further deficits which require cont skilled IPOT intervention, will sign off. Recommend pt DC home w/ assist.  -CS     Row Name 03/15/22 1513          Therapy Assessment/Plan (OT)    Patient/Family Therapy Goal Statement (OT) Return home.  -CS     Criteria for Skilled Therapeutic Interventions Met (OT) no problems identified which require skilled intervention  -CS     Therapy Frequency (OT) evaluation only  -CS     Row Name 03/15/22 1513          Therapy Plan Review/Discharge Plan (OT)    Anticipated Discharge Disposition (OT) home with assist  -CS     Row Name 03/15/22 1513          Vital Signs    Pre Systolic BP Rehab 143  -CS     Pre Treatment Diastolic BP 99  -CS     Post Systolic BP Rehab 164  -CS     Post Treatment Diastolic   -CS     Pretreatment Heart Rate (beats/min) 78  -CS     Posttreatment Heart Rate (beats/min) 82  -CS     Pre SpO2 (%) 96  -CS     O2 Delivery Pre Treatment room air  -CS     Post SpO2 (%) 97  -CS     O2 Delivery Post Treatment room air  -CS     Pre Patient Position Sitting  -CS     Intra Patient Position Standing  -CS     Post Patient Position Sitting  -CS     Row Name 03/15/22 1513          Positioning and Restraints    Pre-Treatment Position sitting in chair/recliner  -CS     Post Treatment Position chair  -CS     In Chair notified nsg;call light within reach;encouraged to call for assist;reclined;waffle cushion;legs elevated  RN deferred exit alarm  -CS           User Key  (r) = Recorded By, (t) = Taken By, (c) = Cosigned By    Initials Name Provider Type    CS Blanca Gillette, OT Occupational Therapist               Outcome Measures     Row Name 03/15/22 1520          How much help from another is currently needed...    Putting on and taking off regular lower body clothing? 4  -CS     Bathing (including washing,  rinsing, and drying) 4  -CS     Toileting (which includes using toilet bed pan or urinal) 4  -CS     Putting on and taking off regular upper body clothing 4  -CS     Taking care of personal grooming (such as brushing teeth) 4  -CS     Eating meals 4  -CS     AM-PAC 6 Clicks Score (OT) 24  -CS     Row Name 03/15/22 1520          Modified [place] Scale    Pre-Stroke Modified [place] Scale 6 - Unable to determine (UTD) from the medical record documentation  -CS     Modified [place] Scale 0 - No Symptoms at all.  -CS     Row Name 03/15/22 1520          Functional Assessment    Outcome Measure Options AM-PAC 6 Clicks Daily Activity (OT);Modified [place]  -CS           User Key  (r) = Recorded By, (t) = Taken By, (c) = Cosigned By    Initials Name Provider Type    CS Blanca Gillette OT Occupational Therapist              Occupational Therapy Education                 Title: PT OT SLP Therapies (In Progress)     Topic: Occupational Therapy (In Progress)     Point: ADL training (Done)     Description:   Instruct learner(s) on proper safety adaptation and remediation techniques during self care or transfers.   Instruct in proper use of assistive devices.              Learning Progress Summary           Patient Acceptance, E, VU by CS at 3/15/2022 1522                   Point: Home exercise program (Not Started)     Description:   Instruct learner(s) on appropriate technique for monitoring, assisting and/or progressing therapeutic exercises/activities.              Learner Progress:  Not documented in this visit.          Point: Precautions (Done)     Description:   Instruct learner(s) on prescribed precautions during self-care and functional transfers.              Learning Progress Summary           Patient Acceptance, E, VU by CS at 3/15/2022 1522                   Point: Body mechanics (Done)     Description:   Instruct learner(s) on proper positioning and spine alignment during self-care, functional mobility activities  and/or exercises.              Learning Progress Summary           Patient Acceptance, E, VU by  at 3/15/2022 1522                               User Key     Initials Effective Dates Name Provider Type Discipline     09/02/21 -  Blanca Gillette OT Occupational Therapist OT              OT Recommendation and Plan  Therapy Frequency (OT): evaluation only  Plan of Care Review  Plan of Care Reviewed With: patient  Progress: improving  Outcome Evaluation: OT eval complete. Pt is independent w/ t/fs, ADLs, and functional mobility. Pt has no further deficits which require cont skilled IPOT intervention, will sign off. Recommend pt DC home w/ assist.  Plan of Care Reviewed With: patient  Outcome Evaluation: OT eval complete. Pt is independent w/ t/fs, ADLs, and functional mobility. Pt has no further deficits which require cont skilled IPOT intervention, will sign off. Recommend pt DC home w/ assist.     Time Calculation:    Time Calculation- OT     Row Name 03/15/22 1524             Time Calculation- OT    OT Start Time 1435  -CS      OT Received On 03/15/22  -CS      OT Goal Re-Cert Due Date 03/25/22  -CS              Timed Charges    68057 - OT Therapeutic Activity Minutes 8  -CS              Untimed Charges    OT Eval/Re-eval Minutes 31  -CS              Total Minutes    Timed Charges Total Minutes 8  -CS      Untimed Charges Total Minutes 31  -CS       Total Minutes 39  -CS            User Key  (r) = Recorded By, (t) = Taken By, (c) = Cosigned By    Initials Name Provider Type     Blanca Gillette OT Occupational Therapist              Therapy Charges for Today     Code Description Service Date Service Provider Modifiers Qty    00181218753  OT THERAPEUTIC ACT EA 15 MIN 3/15/2022 Blanca Gillette OT GO 1    [id number]  OT EVAL LOW COMPLEXITY 3 3/15/2022 Blanca Gillette OT GO 1             OT Discharge Summary  Anticipated Discharge Disposition (OT): home with assist  Reason for Discharge: At baseline  function  Outcomes Achieved: Refer to plan of care for updates on goals achieved  Discharge Destination: Home with assist    Blanca Gillette OT  3/15/2022

## 2022-03-16 ENCOUNTER — APPOINTMENT (OUTPATIENT)
Dept: CARDIOLOGY | Facility: HOSPITAL | Age: 42
End: 2022-03-16

## 2022-03-16 ENCOUNTER — READMISSION MANAGEMENT (OUTPATIENT)
Dept: CALL CENTER | Facility: HOSPITAL | Age: 42
End: 2022-03-16

## 2022-03-16 VITALS
TEMPERATURE: 97.7 F | RESPIRATION RATE: 16 BRPM | SYSTOLIC BLOOD PRESSURE: 145 MMHG | DIASTOLIC BLOOD PRESSURE: 97 MMHG | WEIGHT: 315 LBS | HEIGHT: 66 IN | BODY MASS INDEX: 50.62 KG/M2 | HEART RATE: 91 BPM | OXYGEN SATURATION: 95 %

## 2022-03-16 LAB
BH CV LOWER VASCULAR LEFT COMMON FEMORAL AUGMENT: NORMAL
BH CV LOWER VASCULAR LEFT COMMON FEMORAL COMPRESS: NORMAL
BH CV LOWER VASCULAR LEFT COMMON FEMORAL PHASIC: NORMAL
BH CV LOWER VASCULAR LEFT COMMON FEMORAL SPONT: NORMAL
BH CV LOWER VASCULAR LEFT DISTAL FEMORAL COMPRESS: NORMAL
BH CV LOWER VASCULAR LEFT GASTRONEMIUS COMPRESS: NORMAL
BH CV LOWER VASCULAR LEFT GREATER SAPH AK COMPRESS: NORMAL
BH CV LOWER VASCULAR LEFT GREATER SAPH BK COMPRESS: NORMAL
BH CV LOWER VASCULAR LEFT LESSER SAPH COMPRESS: NORMAL
BH CV LOWER VASCULAR LEFT MID FEMORAL AUGMENT: NORMAL
BH CV LOWER VASCULAR LEFT MID FEMORAL COMPRESS: NORMAL
BH CV LOWER VASCULAR LEFT MID FEMORAL PHASIC: NORMAL
BH CV LOWER VASCULAR LEFT MID FEMORAL SPONT: NORMAL
BH CV LOWER VASCULAR LEFT PERONEAL COMPRESS: NORMAL
BH CV LOWER VASCULAR LEFT POPLITEAL AUGMENT: NORMAL
BH CV LOWER VASCULAR LEFT POPLITEAL COMPRESS: NORMAL
BH CV LOWER VASCULAR LEFT POPLITEAL PHASIC: NORMAL
BH CV LOWER VASCULAR LEFT POPLITEAL SPONT: NORMAL
BH CV LOWER VASCULAR LEFT POSTERIOR TIBIAL COMPRESS: NORMAL
BH CV LOWER VASCULAR LEFT PROFUNDA FEMORAL COMPRESS: NORMAL
BH CV LOWER VASCULAR LEFT PROXIMAL FEMORAL COMPRESS: NORMAL
BH CV LOWER VASCULAR LEFT SAPHENOFEMORAL JUNCTION COMPRESS: NORMAL
BH CV LOWER VASCULAR RIGHT COMMON FEMORAL AUGMENT: NORMAL
BH CV LOWER VASCULAR RIGHT COMMON FEMORAL COMPRESS: NORMAL
BH CV LOWER VASCULAR RIGHT COMMON FEMORAL PHASIC: NORMAL
BH CV LOWER VASCULAR RIGHT COMMON FEMORAL SPONT: NORMAL
BH CV LOWER VASCULAR RIGHT DISTAL FEMORAL COMPRESS: NORMAL
BH CV LOWER VASCULAR RIGHT GASTRONEMIUS COMPRESS: NORMAL
BH CV LOWER VASCULAR RIGHT GREATER SAPH AK COMPRESS: NORMAL
BH CV LOWER VASCULAR RIGHT GREATER SAPH BK COMPRESS: NORMAL
BH CV LOWER VASCULAR RIGHT LESSER SAPH COMPRESS: NORMAL
BH CV LOWER VASCULAR RIGHT MID FEMORAL AUGMENT: NORMAL
BH CV LOWER VASCULAR RIGHT MID FEMORAL COMPRESS: NORMAL
BH CV LOWER VASCULAR RIGHT MID FEMORAL PHASIC: NORMAL
BH CV LOWER VASCULAR RIGHT MID FEMORAL SPONT: NORMAL
BH CV LOWER VASCULAR RIGHT PERONEAL COMPRESS: NORMAL
BH CV LOWER VASCULAR RIGHT POPLITEAL AUGMENT: NORMAL
BH CV LOWER VASCULAR RIGHT POPLITEAL COMPRESS: NORMAL
BH CV LOWER VASCULAR RIGHT POPLITEAL PHASIC: NORMAL
BH CV LOWER VASCULAR RIGHT POPLITEAL SPONT: NORMAL
BH CV LOWER VASCULAR RIGHT POSTERIOR TIBIAL COMPRESS: NORMAL
BH CV LOWER VASCULAR RIGHT PROFUNDA FEMORAL COMPRESS: NORMAL
BH CV LOWER VASCULAR RIGHT PROXIMAL FEMORAL COMPRESS: NORMAL
BH CV LOWER VASCULAR RIGHT SAPHENOFEMORAL JUNCTION COMPRESS: NORMAL
BH CV VAS TCD LEFT ACA: 20 CM/SEC
BH CV VAS TCD LEFT DISTAL M1: 38 CM/SEC
BH CV VAS TCD LEFT MID M1: 25 CM/SEC
BH CV VAS TCD LEFT P1: 22 CM/SEC
BH CV VAS TCD LEFT P2: 27 CM/SEC
BH CV VAS TCD LEFT PROXIMAL M1: 46 CM/SEC
BH CV VAS TCD LEFT TERMINAL ICA: 31 CM/SEC
BH CV VAS TCD RIGHT ACA: 22 CM/SEC
BH CV VAS TCD RIGHT DISTAL M1: 37 CM/SEC
BH CV VAS TCD RIGHT MID M1: 42 CM/SEC
BH CV VAS TCD RIGHT P1: 26 CM/SEC
BH CV VAS TCD RIGHT P2: 30 CM/SEC
BH CV VAS TCD RIGHT PROXIMAL M1: 46 CM/SEC
BH CV VAS TCD RIGHT TERMINAL ICA: 34 CM/SEC
MAXIMAL PREDICTED HEART RATE: 181 BPM
STRESS TARGET HR: 154 BPM

## 2022-03-16 PROCEDURE — 93970 EXTREMITY STUDY: CPT

## 2022-03-16 PROCEDURE — 93893 TCD STD ICR ART VEN-ART SHNT: CPT | Performed by: STUDENT IN AN ORGANIZED HEALTH CARE EDUCATION/TRAINING PROGRAM

## 2022-03-16 PROCEDURE — 93893 TCD STD ICR ART VEN-ART SHNT: CPT

## 2022-03-16 PROCEDURE — 99239 HOSP IP/OBS DSCHRG MGMT >30: CPT | Performed by: NURSE PRACTITIONER

## 2022-03-16 PROCEDURE — 93970 EXTREMITY STUDY: CPT | Performed by: INTERNAL MEDICINE

## 2022-03-16 PROCEDURE — 99232 SBSQ HOSP IP/OBS MODERATE 35: CPT | Performed by: CLINICAL NURSE SPECIALIST

## 2022-03-16 RX ORDER — HYDROCHLOROTHIAZIDE 25 MG/1
50 TABLET ORAL DAILY
Status: DISCONTINUED | OUTPATIENT
Start: 2022-03-16 | End: 2022-03-16

## 2022-03-16 RX ORDER — HYDROCHLOROTHIAZIDE 25 MG/1
25 TABLET ORAL DAILY
Qty: 30 TABLET | Refills: 0 | Status: SHIPPED | OUTPATIENT
Start: 2022-03-17

## 2022-03-16 RX ORDER — METOPROLOL SUCCINATE 50 MG/1
50 TABLET, EXTENDED RELEASE ORAL DAILY
Status: DISCONTINUED | OUTPATIENT
Start: 2022-03-16 | End: 2022-03-16

## 2022-03-16 RX ORDER — METOPROLOL SUCCINATE 50 MG/1
50 TABLET, EXTENDED RELEASE ORAL NIGHTLY
Qty: 30 TABLET | Refills: 0 | Status: SHIPPED | OUTPATIENT
Start: 2022-03-16

## 2022-03-16 RX ORDER — CLOPIDOGREL BISULFATE 75 MG/1
75 TABLET ORAL DAILY
Status: DISCONTINUED | OUTPATIENT
Start: 2022-03-16 | End: 2022-03-16 | Stop reason: HOSPADM

## 2022-03-16 RX ORDER — METOPROLOL SUCCINATE 50 MG/1
50 TABLET, EXTENDED RELEASE ORAL NIGHTLY
Status: DISCONTINUED | OUTPATIENT
Start: 2022-03-16 | End: 2022-03-16 | Stop reason: HOSPADM

## 2022-03-16 RX ORDER — ASPIRIN 325 MG
325 TABLET ORAL DAILY
Status: DISCONTINUED | OUTPATIENT
Start: 2022-03-16 | End: 2022-03-16 | Stop reason: HOSPADM

## 2022-03-16 RX ORDER — CLOPIDOGREL BISULFATE 75 MG/1
75 TABLET ORAL DAILY
Qty: 30 TABLET | Refills: 0 | Status: SHIPPED | OUTPATIENT
Start: 2022-03-17

## 2022-03-16 RX ORDER — FENOFIBRATE 48 MG/1
48 TABLET, COATED ORAL DAILY
Qty: 30 TABLET | Refills: 0 | Status: SHIPPED | OUTPATIENT
Start: 2022-03-17

## 2022-03-16 RX ORDER — ASPIRIN 325 MG
325 TABLET ORAL DAILY
Qty: 30 TABLET | Refills: 0 | Status: SHIPPED | OUTPATIENT
Start: 2022-03-17 | End: 2022-04-18

## 2022-03-16 RX ORDER — ATORVASTATIN CALCIUM 80 MG/1
80 TABLET, FILM COATED ORAL NIGHTLY
Qty: 30 TABLET | Refills: 0 | Status: SHIPPED | OUTPATIENT
Start: 2022-03-16

## 2022-03-16 RX ORDER — FENOFIBRATE 48 MG/1
48 TABLET, COATED ORAL DAILY
Status: DISCONTINUED | OUTPATIENT
Start: 2022-03-16 | End: 2022-03-16 | Stop reason: HOSPADM

## 2022-03-16 RX ORDER — HYDROCHLOROTHIAZIDE 25 MG/1
25 TABLET ORAL DAILY
Status: DISCONTINUED | OUTPATIENT
Start: 2022-03-16 | End: 2022-03-16 | Stop reason: HOSPADM

## 2022-03-16 RX ORDER — LOSARTAN POTASSIUM 25 MG/1
25 TABLET ORAL NIGHTLY
Status: DISCONTINUED | OUTPATIENT
Start: 2022-03-16 | End: 2022-03-16 | Stop reason: HOSPADM

## 2022-03-16 RX ADMIN — CLOPIDOGREL BISULFATE 75 MG: 75 TABLET ORAL at 15:39

## 2022-03-16 RX ADMIN — FENOFIBRATE 48 MG: 48 TABLET ORAL at 14:18

## 2022-03-16 RX ADMIN — Medication 10 ML: at 11:14

## 2022-03-16 RX ADMIN — ASPIRIN 325 MG ORAL TABLET 325 MG: 325 PILL ORAL at 10:58

## 2022-03-16 RX ADMIN — HYDROCHLOROTHIAZIDE 25 MG: 25 TABLET ORAL at 11:58

## 2022-03-16 NOTE — PROGRESS NOTES
Stroke Progress Note       Chief Complaint: left sided paresthesias.     Subjective    Subjective     Subjective:  No acute events overnight, sitting up in chair.  Continues to complain of intermittent dizziness and bilateral lower extremity numbness when he stands as well as generalized facial numbness that comes and goes.  Patient tells me he is compliant with aspirin 81 mg daily at home.    Review of Systems   Constitutional: No fatigue  HENT: Negative for nosebleeds and rhinorrhea.    Eyes: Negative for redness.   Respiratory: Negative for cough.    Gastrointestinal: Negative for anal bleeding.   Endocrine: Negative for polydipsia.   Genitourinary: Negative for enuresis and urgency.   Musculoskeletal: Negative for joint swelling.   Neurological: Negative for tremors.   Psychiatric/Behavioral: Negative for hallucinations.     Objective      Temp:  [96.6 °F (35.9 °C)-98.6 °F (37 °C)] 96.6 °F (35.9 °C)  Heart Rate:  [66-98] 75  Resp:  [16-20] 20  BP: (136-163)/() 163/106      NEURO  MENTAL STATUS: AAOx3, memory intact, fund of knowledge appropriate    LANG/SPEECH: Naming and repetition intact, fluent, follows 3-step commands    CRANIAL NERVES:    Pupils equal and reactive, EOMI intact, no gaze deviation, no nystagmus  No facial droop, cough and gag intact, shoulder shrug intact, tongue midline     MOTOR:  Moves all extremities equally    SENSORY: Normal to light touch all throughout     COORDINATION: Normal finger to nose and heel to shin, no tremor, no dysmetria    STATION: Not assessed due to patient condition    GAIT: Not assessed due to patient condition  Results Review:    I reviewed the patient's new clinical results.    Lab Results (last 24 hours)     Procedure Component Value Units Date/Time    Lipid Panel [[id number]]  (Abnormal) Collected: 03/15/22 1611    Specimen: Blood Updated: 03/15/22 1643     Total Cholesterol 140 mg/dL      Triglycerides 393 mg/dL      HDL Cholesterol 30 mg/dL      LDL  Cholesterol  51 mg/dL      VLDL Cholesterol 59 mg/dL      LDL/HDL Ratio 1.05    Narrative:      Cholesterol Reference Ranges  (U.S. Department of Health and Human Services ATP III Classifications)    Desirable          <200 mg/dL  Borderline High    200-239 mg/dL  High Risk          >240 mg/dL      Triglyceride Reference Ranges  (U.S. Department of Health and Human Services ATP III Classifications)    Normal           <150 mg/dL  Borderline High  150-199 mg/dL  High             200-499 mg/dL  Very High        >500 mg/dL    HDL Reference Ranges  (U.S. Department of Health and Human Services ATP III Classifications)    Low     <40 mg/dl (major risk factor for CHD)  High    >60 mg/dl ('negative' risk factor for CHD)        LDL Reference Ranges  (U.S. Department of Health and Human Services ATP III Classifications)    Optimal          <100 mg/dL  Near Optimal     100-129 mg/dL  Borderline High  130-159 mg/dL  High             160-189 mg/dL  Very High        >189 mg/dL    Hemoglobin A1c [[id number]]  (Abnormal) Collected: 03/15/22 1611    Specimen: Blood Updated: 03/15/22 1642     Hemoglobin A1C 5.80 %     Narrative:      Hemoglobin A1C Ranges:    Increased Risk for Diabetes  5.7% to 6.4%  Diabetes                     >= 6.5%  Diabetic Goal                < 7.0%        CT Head Without Contrast    Result Date: 3/15/2022  Small area of hypodensity in the right cerebellum corresponding to the small acute infarct noted on recent MRI. No acute intracranial finding otherwise.  This report was finalized on 3/15/2022 8:35 PM by Daryl Nelson.      MRI Brain Without Contrast    Result Date: 3/15/2022  Punctate focus of partially restricted diffusion in the right cerebellar hemisphere, without evidence of associated hemorrhage or significant mass effect, favoring subacute lacunar infarct. There is otherwise no evidence of additional acute ischemia, hemorrhage, mass or mass effect.  This report was finalized on 3/15/2022 3:49 PM  by Daryl Nelson.      Results for orders placed during the hospital encounter of 03/14/22    Adult Transthoracic Echo Complete W/ Cont if Necessary Per Protocol (With Agitated Saline)    Interpretation Summary  · Estimated left ventricular EF = 60% Left ventricular systolic function is normal.  · Left ventricular diastolic function was normal.  · Left ventricular wall thickness is consistent with mild concentric hypertrophy.  · Saline test results are negative.    MRI brain shows punctate area of restricted diffusion in the right cerebellum.  TCD results pending    LDL 51, triglycerides 393, hemoglobin A1c 5.80    Assessment/Plan     Assessment/Plan:      41 with vascular risk factors who claims to have at least 3 TIAs with similar symptoms. Patient received IV tPA for left side paresthesias at OSH. His CTAs did not reveal any significant atherosclerosis or LVO.     On my exam, patient neurological exam is normal. There is some functional overlay of symptoms and exam.  His MRI did reveal a small area of infarct in the right cerebellum.    Plan .   TTE did not reveal a PFO, TCD is pending  PT/OT/ Speech can work with the patient   Increase patient's aspirin to full dose strength daily  Follow the post tPA protocol  Normal blood pressure goals, systolic goal of 1 20-1 50, okay to reintroduce patient's home blood pressure medicines  Suggest patient get an outside sleep study for possible obstructive sleep apnea.  Discussed that untreated ANA M can put patients at risk for stroke and heart attack.  If TCD is unrevealing, will consider VIBHA.  B12, folate, TSH due to complaints of paresthesias.  Urine drug screen pending.  Due to elevated triglycerides of 393, would add fenofibrate to his daily medications.    Patient is okay to move to the floor, discussed plan with patient and wife at bedside.  Verbalized understanding.  Stroke neurology will continue to follow patient.  He can follow-up in the outpatient stroke clinic  in [place] in 4 to 6 weeks after discharge.  Please call with any questions or concerns.      LUIS ARMANDO Vázquez  03/16/22  12:03 EDT

## 2022-03-16 NOTE — DISCHARGE SUMMARY
DISCHARGE SUMMARY     Admit date: 3/14/2022  Date of Discharge:  3/16/2022    Discharge Diagnoses  Active Hospital Problems    Diagnosis    • **R/O AIS s/p tPA     • Hypertension    • Dyslipidemia    • Personal history of COVID-19    • Obesity        History reviewed. No pertinent surgical history.    History of Present Illness    Liang Caputo is a 41 y.o. male who presents to [facility] from [facility] on 3/14/22 with complaints of left sided numbness and right-sided weakness concerning for stroke.      There is a PMH of HTN, dyslipidemia, prior + COVID, and obesity . He presented to the OSH ED with intermittent numbness on his occipital region with sustained sporadic numbness in his bilateral hands, feet, and left shin. Mild right-sided weakness noted on OSH clinical exam. LKW ~1500. NIH not mentioned in ACC report. His case was discussed with our stroke navigator team who recommended tPA and transfer for ongoing work-up. Initially the patient refused transfer but is now accepting tPA and corresponding transfer. He will be admitted to the ICU for higher level of care.     Hospital Course    Patient admitted for reasons listed above in HPI. On admission stroke team was consulted and ordered typical post TPA order set. Unfortunately patient was not agreeable to all aspects of typical order set including but not limited to Q30 Minute NIH checks but did agree to q1H checks. Neuro/sensory deficits did ultimately improve. MRI showed a small punctate focus of restricted diffusion in the right cerebellar hemisphere. 24 hour post TPA CT head showed small area of hypodensity in the right cerebellum corresponding to the small acute infarct noted on recent MRI. No acute intracranial findings otherwise. ASA restarted and ultimately increased to 325 mg. Lipitor and Plavix also started. Additionally, due to elevated triglycerides of 393, Fenofibrate was added to his regimen. TTE negative for PFO, interestingly enough,  TCD showed evidence of transient densities indicating right to left shunt. Bilateral lower extremity duplex preliminarily negative (NOT READ BY RADIOLOGIST YET). Plan was to obtain cardiology consult in morning but patient insisted on going home saying he was going to leave AMA if we didn't send him home. Will refer for outpatient cardiology workup for VIBHA and event monitor    PT/OTP: No deficits that require continued skilled IPOT , signed off    Continue ASA, Lipitor, Plavix, Fenofibrate and all home meds  Suggest outside sleep study to be performed by PCP  Follow up B12, Folate, TSH due to complaints of paresthesias  Follow up in 4-6 weeks with outpatient stroke clinic in [place]    Physical Exam:                Constitutional:    Alert, cooperative, in no acute distress   Head:    Normocephalic, without obvious abnormality, atraumatic   Eyes:            Lids and lashes normal, conjunctivae and sclerae normal.  No icterus, no pallor, corneas clear, PER   ENMT:   Ears appear intact with no abnormalities noted           Neck:   No adenopathy, supple, trachea midline, no thyromegaly, no JVD   Lungs/Resp:     Normal effort, symmetric chest rise, no crepitus, clear to      auscultation bilaterally, no chest wall tenderness                Heart/CV:    Regular rhythm and normal rate, normal S1 and S2, no            murmur   Abdomen/GI:     Normal bowel sounds, no masses, no organomegaly, soft        nontender, nondistended   :     Deferred   Extremities/MSK:   No clubbing or cyanosis.  No edema.  Normal tone.    No deformities.    Pulses:   Pulses palpable and equal bilaterally   Skin:   No bleeding, bruising or rash   Heme/Lymph:   No cervical or supraclavicular adenopathy.   Neurologic:     Psychiatric:      Moves all extremities with no obvious focal motor deficit.  Cranial nerves 2 - 12 grossly intact  Oriented x 3, normal affect     Procedures Performed: Bilateral Lower extremity duplex    Consults: Stroke Service  - LUIS ARMANDO Lopez    Pertinent Test Results:             Invalid input(s): LABALBU  Results from last 7 days   Lab Units 03/15/22  1611   HEMOGLOBIN A1C % 5.80*     Bilateral Lower Extremity Duplex Pending read (patient addiment he was leaving tonight)    Condition on Discharge:  Stable    Discharge Disposition:  Home    Discharge Medications:      Discharge Medications      New Medications      Instructions Start Date   aspirin 325 MG tablet  Replaces: aspirin 81 MG chewable tablet   325 mg, Oral, Daily   Start Date: March 17, 2022     clopidogrel 75 MG tablet  Commonly known as: PLAVIX   75 mg, Oral, Daily   Start Date: March 17, 2022     fenofibrate 48 MG tablet  Commonly known as: TRICOR   48 mg, Oral, Daily   Start Date: March 17, 2022        Changes to Medications      Instructions Start Date   atorvastatin 80 MG tablet  Commonly known as: LIPITOR  What changed:   · medication strength  · how much to take   80 mg, Oral, Nightly      hydroCHLOROthiazide 25 MG tablet  Commonly known as: HYDRODIURIL  What changed:   · medication strength  · how much to take  · additional instructions   25 mg, Oral, Daily   Start Date: March 17, 2022     metoprolol succinate XL 50 MG 24 hr tablet  Commonly known as: TOPROL-XL  What changed:   · medication strength  · when to take this  · additional instructions   50 mg, Oral, Nightly         Continue These Medications      Instructions Start Date   losartan 25 MG tablet  Commonly known as: COZAAR   25 mg, Oral, Nightly      YL DHEA 25 MG tablet  Generic drug: Prasterone (DHEA)   25 mg, Oral, Daily, evenings         Stop These Medications    aspirin 81 MG chewable tablet  Replaced by: aspirin 325 MG tablet            Discharge Diet: Diet Regular; Thin; Cardiac    Activity at Discharge: As tolerated    Follow-up Appointments  No future appointments.  Additional Instructions for the Follow-ups that You Need to Schedule     Discharge Follow-up with PCP   As directed        Currently Documented PCP:    Sis Duval APRN    PCP Phone Number:    [phone]     Follow Up Details: 1 week after discharge               Test Results Pending at Discharge       Code Status and Medical Interventions:   Ordered at: 03/15/22 1815     Code Status (Patient has no pulse and is not breathing):    CPR (Attempt to Resuscitate)     Medical Interventions (Patient has pulse or is breathing):    Full Support     To Do:  Continue ASA, Lipitor, Plavix, Fenofibrate and all home meds  Suggest outside sleep study to be performed by PCP  Follow up B12, Folate, TSH due to complaints of paresthesias  Follow up in 4-6 weeks with outpatient stroke clinic in [place]    Electronically signed by:  LUIS ARMANDO Marroquin  03/16/22  18:58 EDT     I performed an independent history and physical examination. Portions of the history were obtained by LUIS ARMANDO and were modified by me according to my findings. The above note reflects my findings, assessment, and plan.    Steven Hampton MD    I spent 35 minutes on discharge on 3/16/2022.  Please see my discharge day progress note for additional details and my physical examination findings.    Electronically signed by:  Steven Hampton MD  04/10/22  21:21 EDT      Discharge Time Spent: 45 Minutes

## 2022-03-16 NOTE — PLAN OF CARE
Goal Outcome Evaluation:  Plan of Care Reviewed With: patient        Progress: no change  Outcome Evaluation: NIH for facial sensory deficit. Patient A&O, VSS, ambulating in room. ASA given after 24 post CT scan was read. Wife at Bedside. Unable to obtain labs. Anticipating DC today.

## 2022-03-16 NOTE — SIGNIFICANT NOTE
03/16/22 1644   SLP Deferred Reason   SLP Deferred Reason Patient unavailable for evaluation  (Reviewed chart & spoke to RN who reported pt may be leaving AMA when ride arrives. Echo in rm when stopped by. RN denied noting cognitive-communication deficits. If remains hospitalized, will attempt to f/u for full cognitive-communication eval per CVA protocol. If leaves hospital prior to eval, physician may consider OP referral to SLP, as indicated.)

## 2022-03-16 NOTE — SIGNIFICANT NOTE
"Spoke with patient regarding need for duplex and timeframe it may take to obtain. He stated he is willing to have any tests performed that can be done prior to his wife arriving back to the hospital and at that time he will be leaving the hospital regardless. I confirmed he understood the risks of not having the test included not knowing if he has a DVT and that if he does have a DVT and does not receive anticoagulation he could potential have additional strokes and/or die. At that time he said \"at my age I could die from anything\". I again asked him if he understood the risks of not having the procedure and the results of. He confirmed that he understood the risks (including death) and that he would be leaving the hospital regardless.   "

## 2022-03-17 NOTE — OUTREACH NOTE
Prep Survey    Flowsheet Row Responses   [facility] facility patient discharged from? [place]   Is LACE score < 7 ? No   Emergency Room discharge w/ pulse ox? No   Eligibility Readm Mgmt   Discharge diagnosis **R/O AIS s/p tPA    Does the patient have one of the following disease processes/diagnoses(primary or secondary)? Stroke (TIA)   Does the patient have Home health ordered? No   Is there a DME ordered? No   Prep survey completed? Yes          JASON GOOD - Registered Nurse

## 2022-03-22 ENCOUNTER — READMISSION MANAGEMENT (OUTPATIENT)
Dept: CALL CENTER | Facility: HOSPITAL | Age: 42
End: 2022-03-22

## 2022-03-22 NOTE — OUTREACH NOTE
Stroke Week 1 Survey    Flowsheet Row Responses   [facility] patient discharged from? [place]   Does the patient have one of the following disease processes/diagnoses(primary or secondary)? Stroke (TIA)   Week 1 attempt successful? Yes   Call start time 1324   Call end time 1326   Discharge diagnosis **R/O AIS s/p tPA    Meds reviewed with patient/caregiver? Yes   Is the patient having any side effects they believe may be caused by any medication additions or changes? No   Does the patient have all medications ordered at discharge? Yes   Is the patient taking all medications as directed (includes completed medication regime)? Yes   Does the patient have a primary care provider?  Yes   Has the patient kept scheduled appointments due by today? Yes  [PCP 3-21-22]   Psychosocial issues? No   Does the patient require any assistance with activities of daily living such as eating, bathing, dressing, walking, etc.? No   Does the patient have any residual symptoms from stroke/TIA? Yes   Residual symptoms comments numb flashes in the face pt reports at times    Does the patient understand the diet ordered at discharge? Yes   Did the patient receive a copy of their discharge instructions? Yes   Nursing interventions Reviewed instructions with patient   What is the patient's perception of their health status since discharge? Improving   Is the patient able to teach back FAST for Stroke? Yes   Is the patient/caregiver able to teach back the risk factors for a stroke? High blood pressure-goal below 120/80   Is the patient/caregiver able to teach back signs and symptoms related to disease process for when to call PCP? Yes   Is the patient/caregiver able to teach back signs and symptoms related to disease process for when to call 911? Yes   If the patient is a current smoker, are they able to teach back resources for cessation? [id number]  [pt reports he is planning on picking up some Nicotine patches from [employer] soon  ]   Is the patient/caregiver able to teach back the hierarchy of who to call/visit for symptoms/problems? PCP, Specialist, Home health nurse, Urgent Care, ED, 911 Yes   Week 1 call completed? Yes          DAVY H - Registered Nurse

## 2022-03-30 ENCOUNTER — READMISSION MANAGEMENT (OUTPATIENT)
Dept: CALL CENTER | Facility: HOSPITAL | Age: 42
End: 2022-03-30

## 2022-03-30 NOTE — OUTREACH NOTE
Stroke Week 2 Survey    Flowsheet Row Responses   [facility] patient discharged from? [place]   Does the patient have one of the following disease processes/diagnoses(primary or secondary)? Stroke (TIA)   Week 2 attempt successful? No   Unsuccessful attempts Attempt 1          MILAGROS BOLIVAR - Registered Nurse

## 2022-04-18 RX ORDER — CLOPIDOGREL BISULFATE 75 MG/1
75 TABLET ORAL DAILY
Qty: 30 TABLET | Refills: 0 | OUTPATIENT
Start: 2022-04-18

## 2022-04-18 RX ORDER — ATORVASTATIN CALCIUM 80 MG/1
80 TABLET, FILM COATED ORAL NIGHTLY
Qty: 30 TABLET | Refills: 0 | OUTPATIENT
Start: 2022-04-18

## 2022-04-18 RX ORDER — FENOFIBRATE 48 MG/1
48 TABLET, COATED ORAL DAILY
Qty: 30 TABLET | Refills: 0 | OUTPATIENT
Start: 2022-04-18

## 2022-04-18 RX ORDER — ASPIRIN 325 MG
TABLET ORAL
Qty: 30 TABLET | Refills: 0 | Status: SHIPPED | OUTPATIENT
Start: 2022-04-18

## 2022-04-18 RX ORDER — HYDROCHLOROTHIAZIDE 25 MG/1
25 TABLET ORAL DAILY
Qty: 30 TABLET | Refills: 0 | OUTPATIENT
Start: 2022-04-18

## 2022-05-26 ENCOUNTER — OFFICE VISIT (OUTPATIENT)
Dept: CARDIOLOGY | Facility: CLINIC | Age: 42
End: 2022-05-26

## 2022-05-26 VITALS
DIASTOLIC BLOOD PRESSURE: 66 MMHG | WEIGHT: 315 LBS | SYSTOLIC BLOOD PRESSURE: 98 MMHG | BODY MASS INDEX: 50.62 KG/M2 | OXYGEN SATURATION: 98 % | HEIGHT: 66 IN | HEART RATE: 82 BPM | TEMPERATURE: 97.1 F

## 2022-05-26 DIAGNOSIS — E78.2 MIXED HYPERLIPIDEMIA: ICD-10-CM

## 2022-05-26 DIAGNOSIS — I28.0 RIGHT TO LEFT CARDIAC SHUNT: Primary | ICD-10-CM

## 2022-05-26 DIAGNOSIS — I10 HYPERTENSION, UNSPECIFIED TYPE: ICD-10-CM

## 2022-05-26 DIAGNOSIS — Z86.73 HISTORY OF STROKE: ICD-10-CM

## 2022-05-26 DIAGNOSIS — E66.01 MORBID OBESITY WITH BMI OF 50.0-59.9, ADULT: ICD-10-CM

## 2022-05-26 PROCEDURE — 99214 OFFICE O/P EST MOD 30 MIN: CPT | Performed by: NURSE PRACTITIONER

## 2022-05-26 PROCEDURE — 93246 EXT ECG>7D<15D RECORDING: CPT | Performed by: INTERNAL MEDICINE

## 2022-05-27 ENCOUNTER — TRANSCRIBE ORDERS (OUTPATIENT)
Dept: ADMINISTRATIVE | Facility: HOSPITAL | Age: 42
End: 2022-05-27

## 2022-05-27 ENCOUNTER — TELEPHONE (OUTPATIENT)
Dept: CARDIOLOGY | Facility: CLINIC | Age: 42
End: 2022-05-27

## 2022-05-27 DIAGNOSIS — I63.9 ACUTE ISCHEMIC STROKE: Primary | ICD-10-CM

## 2022-05-27 DIAGNOSIS — Z11.52 ENCOUNTER FOR SCREENING FOR COVID-19: Primary | ICD-10-CM

## 2022-05-27 NOTE — TELEPHONE ENCOUNTER
SPK WITH BIANCA TO SCHEDULE THIS PATIENT FOR VIBHA, PER ASHLEIGH SANON. SPK TO PATIENT CONFIRMED 6-9-2022 FOR VIBHA, SCHEDULED WITH LYNDSAY IN CATH LAB.  BIANCA WANTS TO CONFIRM THE DIAGNOSIS AND SHE WILL ADD THE VIBHA ORDER AND THE COVID ORDER.

## 2022-05-27 NOTE — TELEPHONE ENCOUNTER
Called and spoke with Lilia at Interventional cardiology. I will place the orders for VIBHA with Dr Martinez.  Also will advise pt of test dates.

## 2022-06-09 ENCOUNTER — APPOINTMENT (OUTPATIENT)
Dept: CARDIOLOGY | Facility: HOSPITAL | Age: 42
End: 2022-06-09

## 2022-06-14 ENCOUNTER — TELEPHONE (OUTPATIENT)
Dept: CARDIOLOGY | Facility: CLINIC | Age: 42
End: 2022-06-14

## 2022-06-14 PROCEDURE — 93000 ELECTROCARDIOGRAM COMPLETE: CPT | Performed by: NURSE PRACTITIONER

## 2022-07-01 ENCOUNTER — TREATMENT (OUTPATIENT)
Dept: CARDIOLOGY | Facility: CLINIC | Age: 42
End: 2022-07-01

## 2022-07-01 DIAGNOSIS — R00.2 PALPITATIONS: ICD-10-CM

## 2022-07-01 PROCEDURE — 93248 EXT ECG>7D<15D REV&INTERPJ: CPT | Performed by: INTERNAL MEDICINE

## 2025-02-11 NOTE — PROGRESS NOTES
Pulmonary/Critical Care Follow-up     LOS: 2 days   Patient Care Team:  Sis Duval APRN as PCP - General (Family Medicine)    Chief Complaint: Strokelike symptoms      Subjective      Initial history (from H&P 3/14/2022):    Liang Caputo is a 41 y.o. male who presents to [facility] from [facility] on 3/14/22 with complaints of left sided numbness and right-sided weakness concerning for stroke.      There is a PMH of HTN, dyslipidemia, prior + COVID, and obesity . He presented to the OSH ED with intermittent numbness on his occipital region with sustained sporadic numbness in his bilateral hands, feet, and left shin. Mild right-sided weakness noted on OSH clinical exam. LKW ~1500. NIH not mentioned in ACC report. His case was discussed with our stroke navigator team who recommended tPA and transfer for ongoing work-up. Initially the patient refused transfer but is now accepting tPA and corresponding transfer. He will be admitted to the ICU for higher level of care.     (End of copied text).    Interval History:     Patient reports he still has some facial numbness and some dizziness at times if he looks down into the right.  Dizziness goes away when he stands up per his report.  No fevers or chills.  No nausea or vomiting.  No dyspnea or chest pain.  No new complaints.      History taken from: Patient    PMH/FH/Social History were reviewed and updated appropriately in the electronic medical record.     Review of Systems:    Review of 14 systems was completed with positives and pertinent negatives noted in the subjective section.  All other systems reviewed and are negative.         Objective     Vital Signs  Temp:  [97.7 °F (36.5 °C)-98.6 °F (37 °C)] 98.6 °F (37 °C)  Heart Rate:  [66-98] 87  Resp:  [16-18] 18  BP: (136-165)/() 154/105  No intake/output data recorded.  Body mass index is 50.84 kg/m².     IV drips:  niCARdipine       Physical Exam:     Constitutional:   Alert, cooperative, in no  acute distress   Head:   Normocephalic, without obvious abnormality, atraumatic   Eyes:           Lids and lashes normal, conjunctivae and sclerae normal.  No icterus, no pallor, corneas clear, PER   ENMT:  Ears appear intact with no abnormalities noted        Neck:  No adenopathy, supple, trachea midline, no thyromegaly, no JVD   Lungs/Resp:    Normal effort, symmetric chest rise, no crepitus, clear to      auscultation bilaterally, no chest wall tenderness                Heart/CV:   Regular rhythm and normal rate, normal S1 and S2, no            murmur   Abdomen/GI:    Normal bowel sounds, no masses, no organomegaly, soft        nontender, nondistended   :    Deferred   Extremities/MSK:  No clubbing or cyanosis.  No edema.  Normal tone.    No deformities.    Pulses:  Pulses palpable and equal bilaterally   Skin:  No bleeding, bruising or rash   Heme/Lymph:  No cervical or supraclavicular adenopathy.   Neurologic:    Psychiatric:    Moves all extremities with no obvious focal motor deficit.  Cranial nerves 2 - 12 grossly intact  Oriented x 3, normal affect.    The above physical exam findings were reviewed and reflect my exam findings as of today's exam.   Electronically signed by:  Steven Hampton MD  03/16/22  08:16 EDT      Results Review:     I reviewed the patient's new clinical results.         Invalid input(s): LABALBU, PROT        Invalid input(s): NEUTOPHILPCT,  EOSPCT            Results from last 7 days   Lab Units 03/15/22  1611   HEMOGLOBIN A1C % 5.80*       I reviewed the patient's new imaging including images and reports.    Transcranial duplex done on 3/16/2022 showed appearance of bubbles suggesting right to left shunt.  Cardiology impression follows:    Interpretation Summary                                          Patient has multiple high intensity transient signals noted in the bilateral  on the injection of agitate saline.  Suggestive of right left shunt/PFO.                                                      Echocardiogram 3/15/2020:    · Estimated left ventricular EF = 60% Left ventricular systolic function is normal.  · Left ventricular diastolic function was normal.  · Left ventricular wall thickness is consistent with mild concentric hypertrophy.  · Saline test results are negative.    MRI brain 3/15/2022:  IMPRESSION:  Punctate focus of partially restricted diffusion in the right cerebellar  hemisphere, without evidence of associated hemorrhage or significant  mass effect, favoring subacute lacunar infarct. There is otherwise no  evidence of additional acute ischemia, hemorrhage, mass or mass effect.    Medication Review:   aspirin, 81 mg, Oral, Daily  atorvastatin, 80 mg, Oral, Nightly  sodium chloride, 10 mL, Intravenous, Q12H      niCARdipine, 5-15 mg/hr        Assessment/Plan         R/O AIS s/p tPA     Hypertension    Dyslipidemia    Personal history of COVID-19    Obesity    41 y.o. smoker admitted for possible stroke.  History of hypertension, hyperlipidemia, prior Covid, obesity.  Patient received TPA prior to arrival.    Patient was refusing evaluation overnight.  Now he is more agreeable.    MRI shows a small right cerebellar stroke.    Echo within normal limits and no evidence of shunt on bubble study.  Interestingly, a transcranial Doppler was done with agitated saline which did show appearance of transient densities suggestive of right to left shunt.    I am going to obtain bilateral lower extremity duplex to evaluate for the possibility of DVT given his symptoms and evidence of a stroke.  Patient may ultimately need a transesophageal echocardiogram with bubble study to evaluate for intracardiac shunt.    Plan:  1.  Stroke status post TPA: No LVO on CTA.  MRI with small right cerebellar stroke.  No evidence of bleed on follow-up CT scan.  Continue aspirin.  Continue statin.  TCD showed evidence of right to left shunt although echocardiogram with bubble study did not show this  evidence.  Neurology is currently evaluating.  I am going to obtain a lower extremity duplex to rule out DVT given the fact that he has had a stroke and at least some evidence of right-to-left shunt.  Neurology plans to refer him to cardiology for an outpatient transesophageal echocardiogram.  They also feel he needs an event monitor.  I will defer to neurology who is going to set this up.  If he has evidence of DVT, he will need to go out on full anticoagulation.  If there is no evidence of DVT, he will go out on aspirin and Plavix per neurology.  2.  Hypertension: Restart home blood pressure medicines.  3.  Neurology following.  Hopefully home today or tomorrow.  Patient is fairly insistent on going home today.    I discussed the above with Dr. Little.    35 minutes spent on discharge.    Electronically signed by:  Steven Hampton MD  03/16/22  08:16 EDT      *. Please note that portions of this note were completed with [employer] - a voice recognition program.    No